# Patient Record
Sex: MALE | Race: WHITE | Employment: OTHER | URBAN - METROPOLITAN AREA
[De-identification: names, ages, dates, MRNs, and addresses within clinical notes are randomized per-mention and may not be internally consistent; named-entity substitution may affect disease eponyms.]

---

## 2018-02-16 ENCOUNTER — OFFICE VISIT (OUTPATIENT)
Dept: HEMATOLOGY | Age: 63
End: 2018-02-16

## 2018-02-16 VITALS
WEIGHT: 232.8 LBS | HEART RATE: 71 BPM | OXYGEN SATURATION: 96 % | TEMPERATURE: 97.7 F | SYSTOLIC BLOOD PRESSURE: 144 MMHG | BODY MASS INDEX: 37.41 KG/M2 | DIASTOLIC BLOOD PRESSURE: 67 MMHG | HEIGHT: 66 IN

## 2018-02-16 DIAGNOSIS — K76.0 FATTY LIVER: Primary | ICD-10-CM

## 2018-02-16 RX ORDER — ASPIRIN 81 MG/1
81 TABLET ORAL DAILY
COMMUNITY
End: 2019-07-24

## 2018-02-16 NOTE — PROGRESS NOTES
70 Daryl Zamora MD, [address], Cite Mongi Slim, 5800 [facility]       April Charanjit Winslow, LIYA Hutchins, NANCY Charles, [facility]   Stef Cordoba, LIYA Sarmiento, NP       Demetria Deputado [facility] Rodriguez 136    at [address], [address] [place] [address], [facility].    [phone]    FAX: [address] [place] [address]    at [address], [address], [address] [place] [address],[address]    1932 [facility] Street: [phone]         Patient Care Team:  Yamileth Mendoza MD as PCP - General (Geriatric Medicine)      Problem List  Date Reviewed: 2/16/2018          Codes Class Noted    Fatty liver ICD-10-CM: K76.0  ICD-9-CM: 571.8  2/16/2018        Chronic blood loss anemia ICD-10-CM: D50.0  ICD-9-CM: 280.0  1/12/2013        Chronic airway obstruction, not elsewhere classified ICD-10-CM: J44.9  ICD-9-CM: 051  1/11/2013        HTN (hypertension), benign ICD-10-CM: I10  ICD-9-CM: 401.1  1/11/2013        Anemia, unspecified ICD-10-CM: D64.9  ICD-9-CM: 285.9  1/11/2013        Obstructive sleep apnea (adult) (pediatric) ICD-10-CM: G47.33  ICD-9-CM: 327.23  6/37/5513        Diastolic CHF, acute on chronic [facility]) ICD-10-CM: I50.33  ICD-9-CM: 428.33, 428.0  8/23/2012        CAD (coronary artery disease) ICD-10-CM: I25.10  ICD-9-CM: 414.00  8/23/2012                The physicians listed above have asked me to see Kami Chucho in consultation regarding elevated liver enzymes and its management. All medical records sent by the referring physicians were reviewed including imaging studies     The patient is a 60 y.o.  male who is suspected to have fatty liver disease. Serologic evaluation was either not perfomred or the results are not available to me. Ultrasound of the liver was performed in 12/2017.   The results of the imaging suggested the liver was normal.      An assessment of liver fibrosis with biopsy or elastography has not been performed. The most recent laboratory studies indicate that the liver transaminases are elevated, ALP is normal, tests of hepatic synthetic and metabolic function are normal, and the platelet count is normal.    The patient has no symptoms which could be attributed to the liver disorder. The patient completes all daily activities without any functional limitations. The patient has not experienced fatigue, pain in the right side over the liver,       ALLERGIES  Allergies   Allergen Reactions    Ciprofloxacin Unknown (comments)    Ezetimibe Unknown (comments)    Penicillins Rash    Pravastatin Unknown (comments)    Simvastatin Unknown (comments)       MEDICATIONS  Current Outpatient Prescriptions   Medication Sig    fluticasone furoate (ARNUITY ELLIPTA) 100 mcg/actuation dsdv inhaler Take 1 Puff by inhalation daily.  exenatide microspheres (BYDUREON BCISE) 2 mg/0.85 mL atIn by SubCUTAneous route.  niroglycerine compounded injection 100-200 mcg once.  insulin lispro protamine/insulin lispro (HUMALOG MIX 50-50 INSULN U-100) 100 unit/mL (50-50) injection by SubCUTAneous route.  UMECLIDINIUM BRM/VILANTEROL TR (ANORO ELLIPTA IN) Take  by inhalation.  INSULIN GLARGINE,HUM. REC. ANLOG (TOUJEO SOLOSTAR U-300 INSULIN SC) by SubCUTAneous route.  aspirin delayed-release 81 mg tablet Take  by mouth daily.  pantoprazole (PROTONIX) 40 mg tablet Take 1 Tab by mouth Before breakfast and dinner.  ferrous sulfate (IRON) 325 mg (65 mg iron) tablet Take 1 Tab by mouth Daily (before breakfast).  tiotropium (SPIRIVA WITH HANDIHALER) 18 mcg inhalation capsule Take 1 Cap by inhalation daily.  omega-3 fatty acids-vitamin e (FISH OIL) 1,000 mg cap Take 1 Cap by mouth daily.  furosemide (LASIX) 40 mg tablet Take 40 mg by mouth daily.       diltiazem (TIAZAC) 180 mg SR capsule Take 180 mg by mouth daily.  guaiFENesin (ORGANIDIN) 400 mg tablet Take  by mouth two (2) times a day.  potassium chloride SR (KLOR-CON 10) 10 mEq tablet Take 2 Tabs by mouth two (2) times a day.  fluticasone-salmeterol (ADVAIR DISKUS) 250-50 mcg/dose diskus inhaler Take 1 Puff by inhalation every twelve (12) hours.  atorvastatin (LIPITOR) 10 mg tablet Take 10 mg by mouth daily.  clonazePAM (KLONOPIN) 0.5 mg tablet Take 0.5 mg by mouth nightly as needed.  losartan (COZAAR) 50 mg tablet Take 50 mg by mouth daily.  metoprolol-XL (TOPROL-XL) 50 mg XL tablet Take 50 mg by mouth daily.  nitroglycerin (NITROSTAT) 0.4 mg SL tablet by SubLINGual route every five (5) minutes as needed.  montelukast (SINGULAIR) 10 mg tablet Take 10 mg by mouth daily.  albuterol (VENTOLIN HFA) 90 mcg/actuation inhaler Take 2 Puffs by inhalation every four (4) hours as needed. No current facility-administered medications for this visit. SYSTEM REVIEW NOT RELATED TO LIVER DISEASE OR REVIEWED ABOVE:  Constitution systems: Negative for fever, chills, weight gain, weight loss. Eyes: Negative for visual changes. ENT: Negative for sore throat, painful swallowing. Respiratory: Negative for cough, hemoptysis, SOB. Cardiology: Negative for chest pain, palpitations. GI:  Negative for constipation or diarrhea. : Negative for urinary frequency, dysuria, hematuria, nocturia. Skin: Negative for rash. Hematology: Negative for easy bruising, blood clots. Musculo-skelatal: Negative for back pain, muscle pain, weakness. Neurologic: Negative for headaches, dizziness, vertigo, memory problems not related to HE. Psychology: Negative for anxiety, depression. FAMILY HISTORY:  The father  of dementia. The mother has the following chronic diseases: dementia. There is no family history of liver disease. SOCIAL HISTORY:  The patient is .     The patient has 2 children, and 3 grandchildren. The patient stopped using tobacco products in 10/1998. The patient has previously consumed alcohol socially never in excess. The patient has been abstinent from alcohol since 6/2014. The patient used to work as a muscician. Burt Mims PHYSICAL EXAMINATION:  Visit Vitals    /67    Pulse 71    Temp 97.7 °F (36.5 °C) (Tympanic)    Ht 5' 6\" (1.676 m)    Wt 232 lb 12.8 oz (105.6 kg)    SpO2 96%    BMI 37.57 kg/m2     General: No acute distress. Eyes: Sclera anicteric. ENT: No oral lesions. Thyroid normal.  Nodes: No adenopathy. Skin: No spider angiomata. No jaundice. No palmar erythema. Respiratory: Lungs clear to auscultation. Cardiovascular: Regular heart rate. No murmurs. No JVD. Abdomen: Soft non-tender. Liver size normal to percussion/palpation. Spleen not palpable. No obvious ascites. Extremities: No edema. No muscle wasting. No gross arthritic changes. Neurologic: Alert and oriented. Cranial nerves grossly intact. No asterixis. LABORATORY STUDIES:  Recent liver function panel, CBC with platelet count and BMP are not available. These studies will be performed. SEROLOGIES:  Not available or performed. Testing was performed today. LIVER HISTOLOGY:  Not available or performed    ENDOSCOPIC PROCEDURES:  Not available or performed    RADIOLOGY:  12/2017. Ultrasound of liver. Normal appearing liver. No liver mass lesions. OTHER TESTING:  Not available or performed    ASSESSMENT AND PLAN:  Elevation in liver enzymes of unclear etiology at this time. Serologic testing to help define the cause of the laboratory abnormality were ordered. Will perform laboratory testing to monitor liver function and degree of liver injury. This will include BMP, hepatic panel, CBC with platelet count, INR. The need to perform an assessment of liver fibrosis was discussed with the patient.   The Fibroscan can assess liver fibrosis and determine if a patient has advanced fibrosis or cirrhosis without the need for liver biopsy. The Fibroscan is currently available at [facility]. This will be performed at the next office visit. If the Fibroscan suggests advanced fibrosis then a liver biopsy should be considered. The patient was directed to continue all current medications at the current dosages. There are no contraindications for the patient to take any medications that are necessary for treatment of other medical issues. The patient was counseled to limit alcohol consumption. All of the above issues were discussed with the patient. All questions were answered. The patient expressed a clear understanding of the above. [address] [place] [address] in 4 weeks for Fibroscan, to review all data and determine the treatment plan.     Kirill Estevez MD  [facility] [address] 2718 [facility] [address] [facility] 22.  [phone]

## 2018-02-16 NOTE — MR AVS SNAPSHOT
1111 [facility] [id number] [facility] [employer] 13 
[phone] Patient: Glody Lopez MRN: [id number] LNR:9/42/1970 Visit Information Date & Time Provider Department Dept. Phone Encounter #  
 2/16/2018  3:00 PM Nancy Rodriguez. Breanna [address] [place] [address] [id number] Follow-up Instructions Return in about 4 weeks (around 3/16/2018) for MLS with FS. Upcoming Health Maintenance Date Due Hepatitis C Screening 1955 DTaP/Tdap/Td series (1 - Tdap) 5/20/1975 FOBT Q 1 YEAR AGE 50-75 5/20/2004 ZOSTER VACCINE AGE 60> 3/20/2014 Influenza Age 5 to Adult 8/1/2017 Allergies as of 2/16/2018  Review Complete On: 2/16/2018 By: Neto Valverde LPN Severity Noted Reaction Type Reactions Ciprofloxacin  08/22/2012    Unknown (comments) Ezetimibe  08/22/2012    Unknown (comments) Penicillins  08/22/2012    Rash Pravastatin  08/22/2012    Unknown (comments) Simvastatin  08/22/2012    Unknown (comments) Current Immunizations  Never Reviewed Name Date ZZZ-RETIRED (DO NOT USE) Pneumococcal Vaccine (Unspecified Type) 1/1/2001 Not reviewed this visit You Were Diagnosed With   
  
 Codes Comments Fatty liver    -  Primary ICD-10-CM: K76.0 ICD-9-CM: 571.8 Vitals BP Pulse Temp Height(growth percentile) Weight(growth percentile) SpO2  
 144/67 71 97.7 °F (36.5 °C) (Tympanic) 5' 6\" (1.676 m) 232 lb 12.8 oz (105.6 kg) 96% BMI Smoking Status 37.57 kg/m2 Former Smoker BMI and BSA Data Body Mass Index Body Surface Area  
 37.57 kg/m 2 2.22 m 2 Your Updated Medication List  
  
   
This list is accurate as of: 2/16/18  4:06 PM.  Always use your most recent med list.  
  
  
  
  
 [facility] 250-50 mcg/dose diskus inhaler Generic drug:  fluticasone-salmeterol Take 1 Puff by inhalation every twelve (12) hours.   
  
 ANORO ELLIPTA IN  
 Take  by inhalation. ARNUITY ELLIPTA 100 mcg/actuation Dsdv inhaler Generic drug:  fluticasone furoate Take 1 Puff by inhalation daily. aspirin delayed-release 81 mg tablet Take  by mouth daily. atorvastatin 10 mg tablet Commonly known as:  LIPITOR Take 10 mg by mouth daily. BYDUREON BCISE 2 mg/0.85 mL Atin Generic drug:  exenatide microspheres  
by SubCUTAneous route. clonazePAM 0.5 mg tablet Commonly known as:  [employer] Take 0.5 mg by mouth nightly as needed. COZAAR 50 mg tablet Generic drug:  losartan Take 50 mg by mouth daily. dilTIAZem 180 mg SR capsule Commonly known as:  [facility] Take 180 mg by mouth daily. ferrous sulfate 325 mg (65 mg iron) tablet Commonly known as:  Iron Take 1 Tab by mouth Daily (before breakfast). FISH OIL 1,000 mg Cap Generic drug:  omega-3 fatty acids-vitamin e Take 1 Cap by mouth daily. guaiFENesin 400 mg tablet Commonly known as:  [employer] [place] Take  by mouth two (2) times a day. HumaLOG Mix 50-50 Insuln U-100 100 unit/mL (50-50) injection Generic drug:  insulin lispro protamine/insulin lispro  
by SubCUTAneous route. LASIX 40 mg tablet Generic drug:  furosemide Take 40 mg by mouth daily. metoprolol succinate 50 mg XL tablet Commonly known as:  TOPROL-XL Take 50 mg by mouth daily. niroglycerine compounded injection 100-200 mcg once. NITROSTAT 0.4 mg SL tablet Generic drug:  nitroglycerin  
by SubLINGual route every five (5) minutes as needed. pantoprazole 40 mg tablet Commonly known as:  PROTONIX Take 1 Tab by mouth Before breakfast and dinner. potassium chloride SR 10 mEq tablet Commonly known as:  KLOR-CON 10 Take 2 Tabs by mouth two (2) times a day. SINGULAIR 10 mg tablet Generic drug:  montelukast  
Take 10 mg by mouth daily. tiotropium 18 mcg inhalation capsule Commonly known as:  [employer] [facility] Weiner Choi Kaggle  
 Take 1 Cap by inhalation daily. TOUJEO SOLOSTAR U-300 INSULIN SC  
by SubCUTAneous route. VENTOLIN HFA 90 mcg/actuation inhaler Generic drug:  albuterol Take 2 Puffs by inhalation every four (4) hours as needed. We Performed the Following ACTIN (SMOOTH MUSCLE) ANTIBODY C6325444 CPT(R)] ALPHA-1-ANTITRYPSIN, TOTAL [72705 CPT(R)] ANTINUCLEAR ANTIBODIES, IFA [id number] CPT(R)] CBC WITH AUTOMATED DIFF [96914 CPT(R)] FERRITIN [76259 CPT(R)] HCV AB W/REFLEX VERIFICATION [[id number] Custom] HEP A AB, TOTAL [id number] CPT(R)] HEP B SURFACE AB [id number] CPT(R)] HEP B SURFACE AG [id number] CPT(R)] HEPATIC FUNCTION PANEL [19616 CPT(R)] HEPATITIS B CORE AB, TOTAL [id number] CPT(R)] IRON PROFILE [id number] CPT(R)] METABOLIC PANEL, BASIC [81313 CPT(R)] PROTHROMBIN TIME + INR [91160 CPT(R)] Follow-up Instructions Return in about 4 weeks (around 3/16/2018) for MLS with FS. To-Do List   
 03/29/2018 11:00 AM  
  Appointment with Devin Duke MD at [address] ([phone]) Introducing [facility] SERVICES! [employer] introduces [employer] patient portal. Now you can access parts of your medical record, email your doctor's office, and request medication refills online. 1. In your internet browser, go to [email or web address] 2. Click on the First Time User? Click Here link in the Sign In box. You will see the New Member Sign Up page. 3. Enter your [employer] Access Code exactly as it appears below. You will not need to use this code after youve completed the sign-up process. If you do not sign up before the expiration date, you must request a new code. · [employer] Access Code: [email or web address] Expires: 5/17/2018  4:06 PM 
 
4. Enter the last four digits of your Social Security Number (xxxx) and Date of Birth (mm/dd/yyyy) as indicated and click Submit. You will be taken to the next sign-up page. 5. Create a [employer] ID. This will be your [employer] login ID and cannot be changed, so think of one that is secure and easy to remember. 6. Create a [employer] password. You can change your password at any time. 7. Enter your Password Reset Question and Answer. This can be used at a later time if you forget your password. 8. Enter your e-mail address. You will receive e-mail notification when new information is available in [address] 9. Click Sign Up. You can now view and download portions of your medical record. 10. Click the Download Summary menu link to download a portable copy of your medical information. If you have questions, please visit the Frequently Asked Questions section of the [employer] website. Remember, [employer] is NOT to be used for urgent needs. For medical emergencies, dial 911. Now available from your iPhone and Android! Please provide this summary of care documentation to your next provider. Your primary care clinician is listed as Teresa Trujillo. If you have any questions after today's visit, please call [phone].

## 2018-02-17 LAB
ALBUMIN SERPL-MCNC: 4 G/DL (ref 3.6–4.8)
ALP SERPL-CCNC: 106 IU/L (ref 39–117)
ALT SERPL-CCNC: 53 IU/L (ref 0–44)
AST SERPL-CCNC: 49 IU/L (ref 0–40)
BASOPHILS # BLD AUTO: 0.1 X10E3/UL (ref 0–0.2)
BASOPHILS NFR BLD AUTO: 0 %
BILIRUB DIRECT SERPL-MCNC: 0.11 MG/DL (ref 0–0.4)
BILIRUB SERPL-MCNC: 0.2 MG/DL (ref 0–1.2)
BUN SERPL-MCNC: 18 MG/DL (ref 8–27)
BUN/CREAT SERPL: 23 (ref 10–24)
CALCIUM SERPL-MCNC: 8.5 MG/DL (ref 8.6–10.2)
CHLORIDE SERPL-SCNC: 100 MMOL/L (ref 96–106)
CO2 SERPL-SCNC: 22 MMOL/L (ref 18–29)
CREAT SERPL-MCNC: 0.78 MG/DL (ref 0.76–1.27)
EOSINOPHIL # BLD AUTO: 0.6 X10E3/UL (ref 0–0.4)
EOSINOPHIL NFR BLD AUTO: 5 %
ERYTHROCYTE [DISTWIDTH] IN BLOOD BY AUTOMATED COUNT: 14.1 % (ref 12.3–15.4)
FERRITIN SERPL-MCNC: 31 NG/ML (ref 30–400)
GFR SERPLBLD CREATININE-BSD FMLA CKD-EPI: 111 ML/MIN/1.73
GFR SERPLBLD CREATININE-BSD FMLA CKD-EPI: 96 ML/MIN/1.73
GLUCOSE SERPL-MCNC: 144 MG/DL (ref 65–99)
HCT VFR BLD AUTO: 45.5 % (ref 37.5–51)
HGB BLD-MCNC: 15.3 G/DL (ref 13–17.7)
IMM GRANULOCYTES # BLD: 0 X10E3/UL (ref 0–0.1)
IMM GRANULOCYTES NFR BLD: 0 %
INR PPP: 1 (ref 0.8–1.2)
IRON SATN MFR SERPL: 22 % (ref 15–55)
IRON SERPL-MCNC: 99 UG/DL (ref 38–169)
LYMPHOCYTES # BLD AUTO: 3.7 X10E3/UL (ref 0.7–3.1)
LYMPHOCYTES NFR BLD AUTO: 28 %
MCH RBC QN AUTO: 31.7 PG (ref 26.6–33)
MCHC RBC AUTO-ENTMCNC: 33.6 G/DL (ref 31.5–35.7)
MCV RBC AUTO: 94 FL (ref 79–97)
MONOCYTES # BLD AUTO: 1.2 X10E3/UL (ref 0.1–0.9)
MONOCYTES NFR BLD AUTO: 9 %
NEUTROPHILS # BLD AUTO: 7.5 X10E3/UL (ref 1.4–7)
NEUTROPHILS NFR BLD AUTO: 58 %
PLATELET # BLD AUTO: 166 X10E3/UL (ref 150–379)
POTASSIUM SERPL-SCNC: 4.1 MMOL/L (ref 3.5–5.2)
PROT SERPL-MCNC: 7.5 G/DL (ref 6–8.5)
PROTHROMBIN TIME: 10.8 SEC (ref 9.1–12)
RBC # BLD AUTO: 4.82 X10E6/UL (ref 4.14–5.8)
SODIUM SERPL-SCNC: 139 MMOL/L (ref 134–144)
TIBC SERPL-MCNC: 442 UG/DL (ref 250–450)
UIBC SERPL-MCNC: 343 UG/DL (ref 111–343)
WBC # BLD AUTO: 13 X10E3/UL (ref 3.4–10.8)

## 2018-02-18 LAB
A1AT SERPL-MCNC: 170 MG/DL (ref 90–200)
ACTIN IGG SERPL-ACNC: 11 UNITS (ref 0–19)

## 2018-02-19 LAB
ANA TITR SER IF: NEGATIVE {TITER}
COMMENT, 144067: NORMAL
HAV AB SER QL IA: NEGATIVE
HBV CORE AB SERPL QL IA: NEGATIVE
HBV SURFACE AB SER QL: NON REACTIVE
HBV SURFACE AG SERPL QL IA: NEGATIVE
HCV AB S/CO SERPL IA: <0.1 S/CO RATIO (ref 0–0.9)

## 2018-03-29 ENCOUNTER — OFFICE VISIT (OUTPATIENT)
Dept: HEMATOLOGY | Age: 63
End: 2018-03-29

## 2018-03-29 VITALS
HEART RATE: 69 BPM | TEMPERATURE: 97.5 F | WEIGHT: 229.6 LBS | BODY MASS INDEX: 37.06 KG/M2 | SYSTOLIC BLOOD PRESSURE: 109 MMHG | OXYGEN SATURATION: 94 % | DIASTOLIC BLOOD PRESSURE: 57 MMHG

## 2018-03-29 DIAGNOSIS — K75.81 NASH (NONALCOHOLIC STEATOHEPATITIS): Primary | ICD-10-CM

## 2018-03-29 NOTE — MR AVS SNAPSHOT
1111 [facility] [id number] [address] [place] [address] 
[phone] Patient: Osmel Rodriguez MRN: [id number] YCL:[birth year] Visit Information Date & Time Provider Department Dept. Phone Encounter #  
 3/29/2018 11:00 AM Delmar Franklin Breanna [place] [address] [id number] Your Appointments 5/3/2018 11:30 AM  
PROCEDURE with MD Rajat Franklin [address] [place] [address]) Appt Note: [address] [id number] [facility] [address]  
[address] [place] [address] 5/17/2018 10:15 AM  
Follow Up with MD Rajat Franklin  [address] [place] [address]) Appt Note: LBX follow up [address] [place] [address] At [facility] [id number] [facility] [address]  
[address] [place] [address] Upcoming Health Maintenance Date Due DTaP/Tdap/Td series (1 - Tdap) 5/20/1975 FOBT Q 1 YEAR AGE 50-75 5/20/2004 ZOSTER VACCINE AGE 60> 3/20/2014 Influenza Age 5 to Adult 8/1/2017 Allergies as of 3/29/2018  Review Complete On: 3/29/2018 By: Russell Kulkarni LPN Severity Noted Reaction Type Reactions Ciprofloxacin  08/22/2012    Unknown (comments) Ezetimibe  08/22/2012    Unknown (comments) Penicillins  08/22/2012    Rash Pravastatin  08/22/2012    Unknown (comments) Simvastatin  08/22/2012    Unknown (comments) Current Immunizations  Never Reviewed Name Date ZZZ-RETIRED (DO NOT USE) Pneumococcal Vaccine (Unspecified Type) 1/1/2001 Not reviewed this visit You Were Diagnosed With   
  
 Codes Comments ALFONSO (nonalcoholic steatohepatitis)    -  Primary ICD-10-CM: Q52.50 ICD-9-CM: 571.8 Vitals BP Pulse Temp Weight(growth percentile) SpO2 BMI  
 109/57 (BP 1 Location: Left arm, BP Patient Position: Sitting) 69 97.5 °F (36.4 °C) (Tympanic) 229 lb 9.6 oz (104.1 kg) 94% 37.06 kg/m2 Smoking Status Former Smoker Vitals History BMI and BSA Data Body Mass Index Body Surface Area 37.06 kg/m 2 2.2 m 2 Your Updated Medication List  
  
   
This list is accurate as of 3/29/18 11:55 AM.  Always use your most recent med list.  
  
  
  
  
 Jenniffer Lawson 250-50 mcg/dose diskus inhaler Generic drug:  fluticasone-salmeterol Take 1 Puff by inhalation every twelve (12) hours. ANORO ELLIPTA IN Take  by inhalation. ARNUITY ELLIPTA 100 mcg/actuation Dsdv inhaler Generic drug:  fluticasone furoate Take 1 Puff by inhalation daily. aspirin delayed-release 81 mg tablet Take  by mouth daily. atorvastatin 10 mg tablet Commonly known as:  LIPITOR Take 10 mg by mouth daily. BYDUREON BCISE 2 mg/0.85 mL Atin Generic drug:  exenatide microspheres  
by SubCUTAneous route. clonazePAM 0.5 mg tablet Commonly known as:  Adaline Apley Take 0.5 mg by mouth nightly as needed. COZAAR 50 mg tablet Generic drug:  losartan Take 50 mg by mouth daily. dilTIAZem 180 mg SR capsule Commonly known as:  [facility] Take 180 mg by mouth daily. ferrous sulfate 325 mg (65 mg iron) tablet Commonly known as:  Iron Take 1 Tab by mouth Daily (before breakfast). FISH OIL 1,000 mg Cap Generic drug:  omega-3 fatty acids-vitamin e Take 1 Cap by mouth daily. guaiFENesin 400 mg tablet Commonly known as:  Stephania Orts Take  by mouth two (2) times a day. HumaLOG Mix 50-50 Insuln U-100 100 unit/mL (50-50) injection Generic drug:  insulin lispro protamine/insulin lispro  
by SubCUTAneous route. LASIX 40 mg tablet Generic drug:  furosemide Take 40 mg by mouth daily. metoprolol succinate 50 mg XL tablet Commonly known as:  TOPROL-XL Take 50 mg by mouth daily. niroglycerine compounded injection 100-200 mcg once. NITROSTAT 0.4 mg SL tablet Generic drug:  nitroglycerin by SubLINGual route every five (5) minutes as needed. pantoprazole 40 mg tablet Commonly known as:  PROTONIX Take 1 Tab by mouth Before breakfast and dinner. potassium chloride SR 10 mEq tablet Commonly known as:  KLOR-CON 10 Take 2 Tabs by mouth two (2) times a day. SINGULAIR 10 mg tablet Generic drug:  montelukast  
Take 10 mg by mouth daily. tiotropium 18 mcg inhalation capsule Commonly known as:  [address] Take 1 Cap by inhalation daily. TOUJEO SOLOSTAR U-300 INSULIN SC  
by SubCUTAneous route. VENTOLIN HFA 90 mcg/actuation inhaler Generic drug:  albuterol Take 2 Puffs by inhalation every four (4) hours as needed. To-Do List   
 04/28/2018 Procedures:  BIOPSY LIVER Introducing [facility] & HEALTH SERVICES! Carmelita Fothergill introduces [employer] patient portal. Now you can access parts of your medical record, email your doctor's office, and request medication refills online. 1. In your internet browser, go to [email or web address] 2. Click on the First Time User? Click Here link in the Sign In box. You will see the New Member Sign Up page. 3. Enter your [employer] Access Code exactly as it appears below. You will not need to use this code after youve completed the sign-up process. If you do not sign up before the expiration date, you must request a new code. · [employer] Access Code: [email or web address] Expires: 5/17/2018  5:06 PM 
 
4. Enter the last four digits of your Social Security Number (xxxx) and Date of Birth (mm/dd/yyyy) as indicated and click Submit. You will be taken to the next sign-up page. 5. Create a [employer] ID. This will be your [employer] login ID and cannot be changed, so think of one that is secure and easy to remember. 6. Create a [employer] password. You can change your password at any time. 7. Enter your Password Reset Question and Answer.  This can be used at a later time if you forget your password. 8. Enter your e-mail address. You will receive e-mail notification when new information is available in [address] 9. Click Sign Up. You can now view and download portions of your medical record. 10. Click the Download Summary menu link to download a portable copy of your medical information. If you have questions, please visit the Frequently Asked Questions section of the [employer] website. Remember, [employer] is NOT to be used for urgent needs. For medical emergencies, dial 911. Now available from your iPhone and Android! Please provide this summary of care documentation to your next provider. Your primary care clinician is listed as Vanita Edmondson. If you have any questions after today's visit, please call [phone].

## 2018-03-29 NOTE — PROGRESS NOTES
70 Daryl Zamora MD, [address], [facility], [place]       April Jannette Malloy, LIYA Cassidy, NANCY Cedeno, ISIDROP-BC   Rossy Dunn, LIYA Guillen [facility] 136    at [address], [address] [facility] [place]  22.    [phone]    FAX: [address] [place] [address] Avenue    at [facility], [address] [place] [address], [address] [facility],[address]    [address] [facility] [address]: [phone]         Patient Care Team:  Jaqueline Kim MD as PCP - General (Geriatric Medicine)      Problem List  Date Reviewed: 2/16/2018          Codes Class Noted    NAFLD (nonalcoholic fatty liver disease) ICD-10-CM: K76.0  ICD-9-CM: 571.8  2/16/2018        Chronic blood loss anemia ICD-10-CM: D50.0  ICD-9-CM: 280.0  1/12/2013        Chronic airway obstruction, not elsewhere classified [facility]) ICD-10-CM: J44.9  ICD-9-CM: 619  1/11/2013        HTN (hypertension), benign ICD-10-CM: I10  ICD-9-CM: 401.1  1/11/2013        Anemia, unspecified ICD-10-CM: D64.9  ICD-9-CM: 285.9  1/11/2013        Obstructive sleep apnea (adult) (pediatric) ICD-10-CM: G47.33  ICD-9-CM: 327.23  0/46/1342        Diastolic CHF, acute on chronic [facility]) ICD-10-CM: I50.33  ICD-9-CM: 428.33, 428.0  8/23/2012        CAD (coronary artery disease) ICD-10-CM: I25.10  ICD-9-CM: 414.00  8/23/2012              Gretta Araya returns to the [address] for management of non-alcoholic fatty liver disease (NAFLD). The active problem list, all pertinent past medical history, medications, radiologic findings and laboratory findings related to the liver disorder were reviewed with the patient. The patient is a 60 y.o.   male who was found to have elevated liver enzymes in 2017 and is suspected to have fatty liver disease. Serologic evaluation for causes of chronic liver disease were negative. Ultrasound of the liver was performed in 12/2017. The results of the imaging suggested the liver was normal.      Assessment of liver fibrosis was performed in the office today with Fibroscan. The result was 31.2 kPa which correlates with cirrhosis. The most recent laboratory studies indicate that the liver transaminases are elevated, ALP is normal, tests of hepatic synthetic and metabolic function are normal, and the platelet count is normal.    The patient has no symptoms which could be attributed to the liver disorder. The patient completes all daily activities without any functional limitations. The patient has not experienced fatigue, pain in the right side over the liver,       ALLERGIES  Allergies   Allergen Reactions    Ciprofloxacin Unknown (comments)    Ezetimibe Unknown (comments)    Penicillins Rash    Pravastatin Unknown (comments)    Simvastatin Unknown (comments)       MEDICATIONS  Current Outpatient Prescriptions   Medication Sig    fluticasone furoate (ARNUITY ELLIPTA) 100 mcg/actuation dsdv inhaler Take 1 Puff by inhalation daily.  exenatide microspheres (BYDUREON BCISE) 2 mg/0.85 mL atIn by SubCUTAneous route.  insulin lispro protamine/insulin lispro (HUMALOG MIX 50-50 INSULN U-100) 100 unit/mL (50-50) injection by SubCUTAneous route.  UMECLIDINIUM BRM/VILANTEROL TR (ANORO ELLIPTA IN) Take  by inhalation.  INSULIN GLARGINE,HUM. REC. ANLOG (TOUJEO SOLOSTAR U-300 INSULIN SC) by SubCUTAneous route.  aspirin delayed-release 81 mg tablet Take  by mouth daily.  pantoprazole (PROTONIX) 40 mg tablet Take 1 Tab by mouth Before breakfast and dinner.  diltiazem (TIAZAC) 180 mg SR capsule Take 180 mg by mouth daily.  atorvastatin (LIPITOR) 10 mg tablet Take 10 mg by mouth daily.  losartan (COZAAR) 50 mg tablet Take 50 mg by mouth daily.       metoprolol-XL (TOPROL-XL) 50 mg XL tablet Take 50 mg by mouth daily.  nitroglycerin (NITROSTAT) 0.4 mg SL tablet by SubLINGual route every five (5) minutes as needed.  montelukast (SINGULAIR) 10 mg tablet Take 10 mg by mouth daily.  albuterol (VENTOLIN HFA) 90 mcg/actuation inhaler Take 2 Puffs by inhalation every four (4) hours as needed.  niroglycerine compounded injection 100-200 mcg once.  ferrous sulfate (IRON) 325 mg (65 mg iron) tablet Take 1 Tab by mouth Daily (before breakfast).  tiotropium (SPIRIVA WITH HANDIHALER) 18 mcg inhalation capsule Take 1 Cap by inhalation daily.  omega-3 fatty acids-vitamin e (FISH OIL) 1,000 mg cap Take 1 Cap by mouth daily.  furosemide (LASIX) 40 mg tablet Take 40 mg by mouth daily.  guaiFENesin (ORGANIDIN) 400 mg tablet Take  by mouth two (2) times a day.  potassium chloride SR (KLOR-CON 10) 10 mEq tablet Take 2 Tabs by mouth two (2) times a day.  fluticasone-salmeterol (ADVAIR DISKUS) 250-50 mcg/dose diskus inhaler Take 1 Puff by inhalation every twelve (12) hours.  clonazePAM (KLONOPIN) 0.5 mg tablet Take 0.5 mg by mouth nightly as needed. No current facility-administered medications for this visit. SYSTEM REVIEW NOT RELATED TO LIVER DISEASE OR REVIEWED ABOVE:  Constitution systems: Negative for fever, chills, weight gain, weight loss. Eyes: Negative for visual changes. ENT: Negative for sore throat, painful swallowing. Respiratory: Negative for cough, hemoptysis, SOB. Cardiology: Negative for chest pain, palpitations. GI:  Negative for constipation or diarrhea. : Negative for urinary frequency, dysuria, hematuria, nocturia. Skin: Negative for rash. Hematology: Negative for easy bruising, blood clots. Musculo-skelatal: Negative for back pain, muscle pain, weakness. Neurologic: Negative for headaches, dizziness, vertigo, memory problems not related to HE. Psychology: Negative for anxiety, depression.        FAMILY HISTORY:  The father  of dementia. The mother has the following chronic diseases: dementia. There is no family history of liver disease. SOCIAL HISTORY:  The patient is . The patient has 2 children, and 3 grandchildren. The patient stopped using tobacco products in 10/1998. The patient has previously consumed alcohol socially never in excess. The patient has been abstinent from alcohol since 2014. The patient used to work as a muscician. Alexa Eckert PHYSICAL EXAMINATION:  Visit Vitals    /57 (BP 1 Location: Left arm, BP Patient Position: Sitting)    Pulse 69    Temp 97.5 °F (36.4 °C) (Tympanic)    Wt 229 lb 9.6 oz (104.1 kg)    SpO2 94%    BMI 37.06 kg/m2     General: No acute distress. Eyes: Sclera anicteric. ENT: No oral lesions. Thyroid normal.  Nodes: No adenopathy. Skin: No spider angiomata. No jaundice. No palmar erythema. Respiratory: Lungs clear to auscultation. Cardiovascular: Regular heart rate. No murmurs. No JVD. Abdomen: Soft non-tender. Liver size normal to percussion/palpation. Spleen not palpable. No obvious ascites. Extremities: No edema. No muscle wasting. No gross arthritic changes. Neurologic: Alert and oriented. Cranial nerves grossly intact. No asterixis.     LABORATORY STUDIES:  Liver [place] of [address] [place] [address] 2018   WBC 3.4 - 10.8 x10E3/uL 13.0 (H)   ANC 1.4 - 7.0 x10E3/uL 7.5 (H)   HGB 13.0 - 17.7 g/dL 15.3    - 379 x10E3/uL 166   INR 0.8 - 1.2 1.0   AST 0 - 40 IU/L 49 (H)   ALT 0 - 44 IU/L 53 (H)   Alk Phos 39 - 117 IU/L 106   Bili, Total 0.0 - 1.2 mg/dL 0.2   Bili, Direct 0.00 - 0.40 mg/dL 0.11   Albumin 3.6 - 4.8 g/dL 4.0   BUN 8 - 27 mg/dL 18   Creat 0.76 - 1.27 mg/dL 0.78   Na 134 - 144 mmol/L 139   K 3.5 - 5.2 mmol/L 4.1   Cl 96 - 106 mmol/L 100   CO2 18 - 29 mmol/L 22   Glucose 65 - 99 mg/dL 144 (H)     SEROLOGIES:  Serologies Latest Ref Rng & Units 2018   Hep A Ab, Total Negative Negative   Hep B Surface Ag Negative Negative   Hep B Core Ab, Total Negative Negative   Hep B Surface AB QL  Non Reactive   Hep C Ab 0.0 - 0.9 s/co ratio <0.1   Ferritin 30 - 400 ng/mL 31   Iron % Saturation 15 - 55 % 22   LAUREN, IFA  Negative   ASMCA 0 - 19 Units 11   Alpha-1 antitrypsin level 90 - 200 mg/dL 170     LIVER HISTOLOGY:  3/2018. FibroScan performed at [facility]. EkPa was 31.2. IQR/med 56%. . Consistent with ALFONSO and cirrhosis. ENDOSCOPIC PROCEDURES:  Not available or performed    RADIOLOGY:  12/2017. Ultrasound of liver. Normal appearing liver. No liver mass lesions. OTHER TESTING:  Not available or performed    ASSESSMENT AND PLAN:  Suspected ALFONSO with cirrhosis. This is based upon Fiboscan with a high CAP and liver stiffness values. Liver transaminases are elevated. Liver function is normal.  The platelet count is normal.      The need to perform liver biopsy to assess disease severity was discussed with the patient. The risks of performing the liver biopsy including pain, puncture of the lung, gallbladder, intestine or kidney and bleeding were discussed. The patient has decided to have a liver biopsy. This will be scheduled. The patient was counseled regarding diet and exercise to achieve weight loss. The best diet for patients with fatty liver is one very low in carbohydrates and enriched with protein such as an Cheyanne's program.      The patient was told to to consume any food products and drinks containing fructose as this enhances hepatic fat synthesis. There is no medication of vitamin supplements that we advocate for ALFONSO. Using glitazones in patients without diabetes mellitus has been shown to reduce fat content in the liver but has no effect on fibrosis and is associated with weight gain. Vitamin E has also been used but the data is not very good and most experts no longer advocate this.       The only medical treatments for ALFONSO are though clinical trials. The patient was offered participation in a clinical trial for treatment of AFLONSO. The patient would like to particiapte in a clinical trial.    The patient was directed to continue all current medications at the current dosages. There are no contraindications for the patient to take any medications that are necessary for treatment of other medical issues including medications for diabetes mellitus and hypercholesterolemia. The patient was counseled regarding alcohol consumption and that this could contribute to fatty liver disease. Vaccination for viral hepatitis A and B is recommended since the patient has no serologic evidence of previous exposure or vaccination with immunity. [facility] [address] screening has recently been performed and does not suggest [facility] [address] The next liver imaging study will be performed in 6/2018. All of the above issues were discussed with the patient. All questions were answered. The patient expressed a clear understanding of the above. [address] [address] in 2 weeks after liver biopsy.     Analy Jauregui MD  Liver [place] of [address] [place] [address] [address]  [address].  [phone]

## 2018-05-03 ENCOUNTER — APPOINTMENT (OUTPATIENT)
Dept: ULTRASOUND IMAGING | Age: 63
End: 2018-05-03
Attending: INTERNAL MEDICINE
Payer: COMMERCIAL

## 2018-05-03 ENCOUNTER — HOSPITAL ENCOUNTER (OUTPATIENT)
Age: 63
Setting detail: OUTPATIENT SURGERY
Discharge: HOME | End: 2018-05-03
Attending: INTERNAL MEDICINE | Admitting: INTERNAL MEDICINE
Payer: COMMERCIAL

## 2018-05-03 VITALS
BODY MASS INDEX: 37.12 KG/M2 | TEMPERATURE: 98.1 F | RESPIRATION RATE: 9 BRPM | WEIGHT: 230 LBS | OXYGEN SATURATION: 94 % | SYSTOLIC BLOOD PRESSURE: 200 MMHG | HEART RATE: 65 BPM | DIASTOLIC BLOOD PRESSURE: 81 MMHG

## 2018-05-03 DIAGNOSIS — K76.0 FATTY (CHANGE OF) LIVER, NOT ELSEWHERE CLASSIFIED: ICD-10-CM

## 2018-05-03 LAB
GLUCOSE BLD STRIP.AUTO-MCNC: 118 MG/DL (ref 65–100)
SERVICE CMNT-IMP: ABNORMAL

## 2018-05-03 PROCEDURE — 74011250636 HC RX REV CODE- 250/636: Performed by: INTERNAL MEDICINE

## 2018-05-03 PROCEDURE — 88313 SPECIAL STAINS GROUP 2: CPT | Performed by: INTERNAL MEDICINE

## 2018-05-03 PROCEDURE — 76942 ECHO GUIDE FOR BIOPSY: CPT

## 2018-05-03 PROCEDURE — 88307 TISSUE EXAM BY PATHOLOGIST: CPT | Performed by: INTERNAL MEDICINE

## 2018-05-03 PROCEDURE — 76040000007: Performed by: INTERNAL MEDICINE

## 2018-05-03 PROCEDURE — 82962 GLUCOSE BLOOD TEST: CPT

## 2018-05-03 PROCEDURE — 77030013826 HC NDL BIOP MAXCOR BARD -B: Performed by: INTERNAL MEDICINE

## 2018-05-03 PROCEDURE — 74011000250 HC RX REV CODE- 250: Performed by: INTERNAL MEDICINE

## 2018-05-03 RX ORDER — LIDOCAINE HYDROCHLORIDE 10 MG/ML
10 INJECTION INFILTRATION; PERINEURAL ONCE
Status: COMPLETED | OUTPATIENT
Start: 2018-05-03 | End: 2018-05-03

## 2018-05-03 RX ORDER — SODIUM CHLORIDE 0.9 % (FLUSH) 0.9 %
5-10 SYRINGE (ML) INJECTION AS NEEDED
Status: DISCONTINUED | OUTPATIENT
Start: 2018-05-03 | End: 2018-05-03 | Stop reason: HOSPADM

## 2018-05-03 RX ORDER — ONDANSETRON 2 MG/ML
4 INJECTION INTRAMUSCULAR; INTRAVENOUS
Status: DISCONTINUED | OUTPATIENT
Start: 2018-05-03 | End: 2018-05-03 | Stop reason: HOSPADM

## 2018-05-03 RX ORDER — HYDROMORPHONE HYDROCHLORIDE 2 MG/ML
1 INJECTION, SOLUTION INTRAMUSCULAR; INTRAVENOUS; SUBCUTANEOUS ONCE
Status: COMPLETED | OUTPATIENT
Start: 2018-05-03 | End: 2018-05-03

## 2018-05-03 RX ORDER — HYDROMORPHONE HYDROCHLORIDE 2 MG/ML
1 INJECTION, SOLUTION INTRAMUSCULAR; INTRAVENOUS; SUBCUTANEOUS
Status: DISCONTINUED | OUTPATIENT
Start: 2018-05-03 | End: 2018-05-03 | Stop reason: HOSPADM

## 2018-05-03 RX ORDER — SODIUM CHLORIDE 0.9 % (FLUSH) 0.9 %
5-10 SYRINGE (ML) INJECTION EVERY 8 HOURS
Status: DISCONTINUED | OUTPATIENT
Start: 2018-05-03 | End: 2018-05-03 | Stop reason: HOSPADM

## 2018-05-03 RX ORDER — ONDANSETRON 2 MG/ML
4-8 INJECTION INTRAMUSCULAR; INTRAVENOUS ONCE
Status: DISCONTINUED | OUTPATIENT
Start: 2018-05-03 | End: 2018-05-03

## 2018-05-03 RX ADMIN — HYDROMORPHONE HYDROCHLORIDE 1 MG: 2 INJECTION INTRAMUSCULAR; INTRAVENOUS; SUBCUTANEOUS at 13:00

## 2018-05-03 RX ADMIN — LIDOCAINE HYDROCHLORIDE 20 ML: 10 INJECTION, SOLUTION INFILTRATION; PERINEURAL at 11:58

## 2018-05-03 RX ADMIN — HYDROMORPHONE HYDROCHLORIDE 1 MG: 2 INJECTION INTRAMUSCULAR; INTRAVENOUS; SUBCUTANEOUS at 12:29

## 2018-05-03 RX ADMIN — ONDANSETRON HYDROCHLORIDE 4 MG: 2 INJECTION INTRAMUSCULAR; INTRAVENOUS at 13:28

## 2018-05-03 NOTE — H&P
70 Daryl Zamora MD, FACP, [facility], [place]       Brian Jacinto, LIYA Holguin, NANCY Wing, ACNP-BC   Aubrey Medina, LIYA Davis [facility] 136    at [address], [address] [place]  22.    [phone]    FAX: [address] [place] [address], [address]    [phone]    FAX: [phone]       PRE-PROCEDURE NOTE - LIVER BIOPSY    H and P from last office visit reviewed. Allergies reviewed. Out-patient medication list reviewed. Patient Active Problem List   Diagnosis Code    Diastolic CHF, acute on chronic (HCC) I50.33    CAD (coronary artery disease) I25.10    Chronic airway obstruction, not elsewhere classified J44.9    HTN (hypertension), benign I10    Anemia, unspecified D64.9    Obstructive sleep apnea (adult) (pediatric) G47.33    Chronic blood loss anemia D50.0    NAFLD (nonalcoholic fatty liver disease) K76.0       Allergies   Allergen Reactions    Ciprofloxacin Unknown (comments)    Ezetimibe Unknown (comments)    Penicillins Rash    Pravastatin Unknown (comments)    Simvastatin Unknown (comments)       No current facility-administered medications on file prior to encounter. Current Outpatient Prescriptions on File Prior to Encounter   Medication Sig Dispense Refill    fluticasone furoate (ARNUITY ELLIPTA) 100 mcg/actuation dsdv inhaler Take 1 Puff by inhalation daily.  insulin lispro protamine/insulin lispro (HUMALOG MIX 50-50 INSULN U-100) 100 unit/mL (50-50) injection by SubCUTAneous route.  UMECLIDINIUM BRM/VILANTEROL TR (ANORO ELLIPTA IN) Take  by inhalation.  aspirin delayed-release 81 mg tablet Take  by mouth daily.       diltiazem (TIAZAC) 180 mg SR capsule Take 180 mg by mouth daily.  guaiFENesin (ORGANIDIN) 400 mg tablet Take  by mouth two (2) times a day.  atorvastatin (LIPITOR) 10 mg tablet Take 10 mg by mouth daily.  losartan (COZAAR) 50 mg tablet Take 50 mg by mouth daily.  metoprolol-XL (TOPROL-XL) 50 mg XL tablet Take 50 mg by mouth daily.  montelukast (SINGULAIR) 10 mg tablet Take 10 mg by mouth daily.  albuterol (VENTOLIN HFA) 90 mcg/actuation inhaler Take 2 Puffs by inhalation every four (4) hours as needed.  exenatide microspheres (BYDUREON BCISE) 2 mg/0.85 mL atIn by SubCUTAneous route.  INSULIN GLARGINE,HUM. REC. ANLOG (TOUJEO SOLOSTAR U-300 INSULIN SC) by SubCUTAneous route.  furosemide (LASIX) 40 mg tablet Take 40 mg by mouth daily.  nitroglycerin (NITROSTAT) 0.4 mg SL tablet by SubLINGual route every five (5) minutes as needed. For liver biopsy to assess NALFD. The risks of the procedure were discussed with the patient. This included bleeding, pain, and puncture of other organs. All questions were answered. The patient wishes to proceed with the procedure. PHYSICAL EXAMINATION:  VS: per nursing note  General: No acute distress. Eyes: Sclera anicteric. ENT: No oral lesions. Thyroid normal.  Nodes: No adenopathy. Skin: No spider angiomata. No jaundice. No palmar erythema. Respiratory: Lungs clear to auscultation. Cardiovascular: Regular heart rate. No murmurs. No JVD. Abdomen: Soft non-tender, liver size normal to percussion/palpation. Spleen not palpable. No obvious ascites. Extremities: No edema. No muscle wasting. No gross arthritic changes. Neurologic: Alert and oriented. Cranial nerves grossly intact. No asterixis.       LABS:  Lab Results   Component Value Date/Time    WBC 13.0 (H) 02/16/2018 12:00 AM    Hemoglobin (POC) 13.3 12/05/2012 12:23 PM    HGB 15.3 02/16/2018 12:00 AM    Hematocrit (POC) 39 12/05/2012 12:23 PM    HCT 45.5 02/16/2018 12:00 AM    PLATELET 867 58/40/2537 12:00 AM    MCV 94 02/16/2018 12:00 AM     Lab Results   Component Value Date/Time    INR 1.0 02/16/2018 12:00 AM    INR 1.0 01/11/2013 06:50 PM    INR 1.0 08/22/2012 09:05 AM    Prothrombin time 10.8 02/16/2018 12:00 AM    Prothrombin time 10.0 01/11/2013 06:50 PM    Prothrombin time 10.5 08/22/2012 09:05 AM       ASSESSMENT AND PLAN:  Liver biopsy under ultrasound guidance.     Nettie Painting MD  Liver [place] of [address] [place] [address] [address] [place] [address] [place], [address] [place]  22.  [phone]

## 2018-05-03 NOTE — PROCEDURES
70 Daryl Zamora MD, [place], [place], [place]       Roselee Ahumada, LIYA Manning, NANCY Glez, [facility]-BC   LIYA Guallpa NP [address] [facility] 136    at [address], [facility] [place]  22.    [phone]    FAX: [address] [place] [address], [address] [place] [address] - [address]    [phone]    FAX: [phone]       LIVER BIOPSY PROCEDURE NOTE    Max Montgomery  1955    INDICATIONS/PRE-OPERATIVE  DIAGNOSIS:  Elevated liver enzymes. Suspect NAFLD    : Pat Mendez MD    SEDATION: 1% Lidocaine injection 10 ml    PROCEDURE:  Informed consent to perform the procedure was obtained from the patient. The patient was positioned on the edge of the stretcher lying flat in the supine position. Ultrasound was utilized to image the liver. The diaphragm and any major mass lesion or vascular structures within the liver were identified. An appropriate site for liver biopsy was identified. The distance from the surface of the skin to the liver capsule was 3 cm. This area was prepped with betadine and draped in sterile fashion. The skin was infiltrated with 1% lidocaine. The deeper subcutanous tissues and liver capsule overlying the biopsy site were then infiltrated with 1% lidocaine until appropriate anesthesia was obtained. A small incision was made in the skin so the biopsy devise could be easily inserted. A total of 2 passes with the 18 gauge Bard biopsy devise was then made into the liver. Core(s) of liver tissue totaling 4 cm in length were obtained and placed into tissue fixative. A band aid was placed over the biopsy site.   The patient was then repositioned on the right side and transported to the recovery area on the stretcher for routine monitoring until discharge. The specimen was sent to pathology for processing via the normal transport mechanism. SPECIMEN REMOVED: Liver    ESTIMATED BLOOD LOSS: Negligible.       POST-OPERATIVE DIAGNOSIS: Same as Pre-operative Diagnosis    Kimberly Canales MD       5/3/2018  12:43 PM

## 2018-05-03 NOTE — DISCHARGE INSTRUCTIONS
70 Daryl Zamora MD, FACP, [facility] [place], [place]       LIYA Sams PA-C Marylin Arm, [facility]   LIYA Bingham NP [address] [facility] 136    at [address], [address] [place]  22.    [phone]    FAX: [address] [place] [address], [address]    [phone]    FAX: [phone]       LIVER BIOPSY DISCHARGE INSTRUCTIONS      Kerry Cruz  1955  Date: 5/3/2018    DIET:    Gustavo Dunn may resume your previous diet. ACTIVITIES:  Rest quietly the rest of today. You should not lift any objects more than 20 pounds for the next 2 days. If you work sitting down without strenuous activity you may return to work tomorrow. If you exert yourself or do heavy lifting at work you should take tomorrow off. Do not drive or operate hazardous machinery for 12 hours. SPECIAL INSTRUCTIONS:  Do not use any aspirin or non-steroidal (Motin, Advil, Naproxen, etc) pain medications for the next 3 days. You may use extra-strength Tylenol (acetaminophen) if you experience pain or discomfort later today. Call the [address] office if you experience any of the following:  Persistent or severe abdominal pain. Persistent or severe abdominal distention. Fever and chills   Nausea and vomiting. New or unusual symptoms. Follow-up care: You should have a follow up appointment with Dr. Samantha Valiente to review the results of the liver biopsy results in 2 weeks. If you do not have an appointment please call the office at the number listed above to schedule this. Other instructions:    If you have any problems or questions call the [address] [place] [address] office at the phone number listed above. DISCHARGE SUMMARY from Nurse:     The following personal items collected during your admission are returned to you:   Dental Appliance:    Vision: Visual Aid: Glasses  Hearing Aid:    Jewelry:    Clothing:    Other Valuables:    Valuables sent to safe:

## 2018-05-03 NOTE — IP AVS SNAPSHOT
[address] 13 
[phone] Patient: Monica Locke MRN: [id number] F About your hospitalization You were admitted on:  May 3, 2018 You last received care in the:  [facility] ENDOSCOPY You were discharged on:  May 3, 2018 Why you were hospitalized Your primary diagnosis was:  Not on File Follow-up Information Follow up With Details Comments Contact Info Ashleigh Dobson MD   [address] [facility] [address] [phone] Your Scheduled Appointments Thursday May 17, 2018 10:15 AM EDT Follow Up with MD Juan Jose Myrickfnarcoleman 75 ([address] [phone] Stu Benson 13  
[phone] Discharge Orders None A check jayde indicates which time of day the medication should be taken. My Medications CONTINUE taking these medications Instructions Each Dose to Equal  
 Morning Noon Evening Bedtime ANORO ELLIPTA IN Your last dose was: Your next dose is: Take  by inhalation. ARNUITY ELLIPTA 100 mcg/actuation Dsdv inhaler Generic drug:  fluticasone furoate Your last dose was: Your next dose is: Take 1 Puff by inhalation daily. 1 Puff  
    
   
   
   
  
 aspirin delayed-release 81 mg tablet Your last dose was: Your next dose is: Take  by mouth daily. atorvastatin 10 mg tablet Commonly known as:  LIPITOR Your last dose was: Your next dose is: Take 10 mg by mouth daily. 10 mg  
    
   
   
   
  
 BYDUREON BCISE 2 mg/0.85 mL Atin Generic drug:  exenatide microspheres Your last dose was: Your next dose is:    
   
   
 by SubCUTAneous route. COZAAR 50 mg tablet Generic drug:  losartan Your last dose was: Your next dose is: Take 50 mg by mouth daily. 50 mg  
    
   
   
   
  
 dilTIAZem 180 mg SR capsule Commonly known as:  [facility] Your last dose was: Your next dose is: Take 180 mg by mouth daily. 180 mg  
    
   
   
   
  
 guaiFENesin 400 mg tablet Commonly known as:  [place] Your last dose was: Your next dose is: Take  by mouth two (2) times a day. HumaLOG Mix 50-50 Insuln U-100 100 unit/mL (50-50) injection Generic drug:  insulin lispro protamine/insulin lispro Your last dose was: Your next dose is:    
   
   
 by SubCUTAneous route. LASIX 40 mg tablet Generic drug:  furosemide Your last dose was: Your next dose is: Take 40 mg by mouth daily. 40 mg  
    
   
   
   
  
 metoprolol succinate 50 mg XL tablet Commonly known as:  TOPROL-XL Your last dose was: Your next dose is: Take 50 mg by mouth daily. 50 mg NITROSTAT 0.4 mg SL tablet Generic drug:  nitroglycerin Your last dose was: Your next dose is:    
   
   
 by SubLINGual route every five (5) minutes as needed. PREVACID PO Your last dose was: Your next dose is: Take  by mouth. SINGULAIR 10 mg tablet Generic drug:  montelukast  
   
Your last dose was: Your next dose is: Take 10 mg by mouth daily. 10 mg  
    
   
   
   
  
 TOUJEO SOLOSTAR U-300 INSULIN SC Your last dose was: Your next dose is:    
   
   
 by SubCUTAneous route. VENTOLIN HFA 90 mcg/actuation inhaler Generic drug:  albuterol Your last dose was: Your next dose is: Take 2 Puffs by inhalation every four (4) hours as needed. 2 Puff Discharge Instructions [address] [facility],[address] [facility] Diane Groves MD, Migue Sharp, ALVAREZSLD Olesya Morris, NANCY Casillas, [facility]   Amber Alejo, LIYA Deluca, LIYA Lowe [facility] 136 
  at [address], [facility] 2001 Shabana Loomis  22. 
  [phone] FAX: [address] [place] [address] Avenue 
  [facility] 
  [address], [address] [address] [facility], [address] 
  [phone] FAX: [phone] LIVER BIOPSY DISCHARGE INSTRUCTIONS Jeffery Lee 1955 Date: 5/3/2018 DIET:   
You may resume your previous diet. ACTIVITIES: 
Rest quietly the rest of today. You should not lift any objects more than 20 pounds for the next 2 days. If you work sitting down without strenuous activity you may return to work tomorrow. If you exert yourself or do heavy lifting at work you should take tomorrow off. Do not drive or operate hazardous machinery for 12 hours. SPECIAL INSTRUCTIONS: 
Do not use any aspirin or non-steroidal (Motin, Advil, Naproxen, etc) pain medications for the next 3 days. You may use extra-strength Tylenol (acetaminophen) if you experience pain or discomfort later today. Call the [address] office if you experience any of the following: 
Persistent or severe abdominal pain. Persistent or severe abdominal distention. Fever and chills Nausea and vomiting. New or unusual symptoms. Follow-up care: You should have a follow up appointment with Dr. Nika Landry to review the results of the liver biopsy results in 2 weeks. If you do not have an appointment please call the office at the number listed above to schedule this. Other instructions: If you have any problems or questions call the [address] office at the phone number listed above. DISCHARGE SUMMARY from Nurse: The following personal items collected during your admission are returned to you:  
Dental Appliance:   
Vision: Visual Aid: Glasses Hearing Aid:   
Jewelry:   
Clothing:   
Other Valuables:   
Valuables sent to safe:   
 
 
  
  
  
Introducing [facility] [place] [facility]! [facility] introduces LinguaNext patient portal. Now you can access parts of your medical record, email your doctor's office, and request medication refills online. 1. In your internet browser, go to [email or web address] 2. Click on the First Time User? Click Here link in the Sign In box. You will see the New Member Sign Up page. 3. Enter your LinguaNext Access Code exactly as it appears below. You will not need to use this code after youve completed the sign-up process. If you do not sign up before the expiration date, you must request a new code. · LinguaNext Access Code: [email or web address] Expires: 5/17/2018  5:06 PM 
 
4. Enter the last four digits of your Social Security Number (xxxx) and Date of Birth (mm/dd/yyyy) as indicated and click Submit. You will be taken to the next sign-up page. 5. Create a LinguaNext ID. This will be your LinguaNext login ID and cannot be changed, so think of one that is secure and easy to remember. 6. Create a LinguaNext password. You can change your password at any time. 7. Enter your Password Reset Question and Answer. This can be used at a later time if you forget your password. 8. Enter your e-mail address. You will receive e-mail notification when new information is available in [address] 9. Click Sign Up. You can now view and download portions of your medical record. 10. Click the Download Summary menu link to download a portable copy of your medical information.  
 
If you have questions, please visit the Frequently Asked Questions section of the [employer]. Remember, MyChart is NOT to be used for urgent needs. For medical emergencies, dial 911. Now available from your iPhone and Android! Introducing Steve Reid As a [employer] patient, I wanted to make you aware of our electronic visit tool called Steve Reid. [employer] 24/7 allows you to connect within minutes with a medical provider 24 hours a day, seven days a week via a mobile device or tablet or logging into a secure website from your computer. You can access Steve Reid from anywhere in the [place]. A virtual visit might be right for you when you have a simple condition and feel like you just dont want to get out of bed, or cant get away from work for an appointment, when your regular [employer] provider is not available (evenings, weekends or holidays), or when youre out of town and need minor care. Electronic visits cost only $49 and if the [employer] 24/7 provider determines a prescription is needed to treat your condition, one can be electronically transmitted to a nearby pharmacy*. Please take a moment to enroll today if you have not already done so. The enrollment process is free and takes just a few minutes. To enroll, please download the [employer] 24/7 rhonda to your tablet or phone, or visit [email or web address] to enroll on your computer. And, as an [address] [place] [address] patient with a [employer] account, the results of your visits will be scanned into your electronic medical record and your primary care provider will be able to view the scanned results. We urge you to continue to see your regular [employer] provider for your ongoing medical care. And while your primary care provider may not be the one available when you seek a Steve Reid virtual visit, the peace of mind you get from getting a real diagnosis real time can be priceless. For more information on Steve Reid, view our Frequently Asked Questions (FAQs) at [email or web address]. Sincerely, 
 
Jaycee Tamez MD 
Chief Medical Officer [address] *:  certain medications cannot be prescribed via Steve Reid Unresulted Labs-Please follow up with your PCP about these lab tests Order Current Status [facility] In process Providers Seen During Your Hospitalization Provider Specialty Primary office phone Michelle Grande MD Hepatology [phone] Your Primary Care Physician (PCP) Primary Care Physician Office Phone Office Fax Frankey Erik [phone] You are allergic to the following Allergen Reactions Ciprofloxacin Unknown (comments) Ezetimibe Unknown (comments) Penicillins Rash Pravastatin Unknown (comments) Simvastatin Unknown (comments) Recent Documentation Weight BMI Smoking Status 104.3 kg 37.12 kg/m2 Former Smoker Emergency Contacts Name Discharge Info Relation Home Work Mobile Alfonzo Jaime CAREGIVER [3] Spouse [3] [phone] Patient Belongings The following personal items are in your possession at time of discharge: 
     Visual Aid: Glasses Please provide this summary of care documentation to your next provider. Signatures-by signing, you are acknowledging that this After Visit Summary has been reviewed with you and you have received a copy. Patient Signature:  ____________________________________________________________ Date:  ____________________________________________________________  
  
Ritchie Cart Provider Signature:  ____________________________________________________________ Date:  ____________________________________________________________

## 2018-05-03 NOTE — PROGRESS NOTES
Pt still complaining of pain in the R shoulder, 1 mg of Dilaudid given. Pt able to tolerate liquids.

## 2018-05-17 ENCOUNTER — OFFICE VISIT (OUTPATIENT)
Dept: HEMATOLOGY | Age: 63
End: 2018-05-17

## 2018-05-17 VITALS
DIASTOLIC BLOOD PRESSURE: 67 MMHG | HEIGHT: 69 IN | SYSTOLIC BLOOD PRESSURE: 121 MMHG | WEIGHT: 229.4 LBS | TEMPERATURE: 97.8 F | BODY MASS INDEX: 33.98 KG/M2 | HEART RATE: 71 BPM | OXYGEN SATURATION: 96 %

## 2018-05-17 DIAGNOSIS — K74.60 CIRRHOSIS OF LIVER WITHOUT ASCITES, UNSPECIFIED HEPATIC CIRRHOSIS TYPE (HCC): Primary | ICD-10-CM

## 2018-05-17 PROBLEM — E11.9 TYPE II DIABETES MELLITUS (HCC): Status: ACTIVE | Noted: 2018-05-17

## 2018-05-17 NOTE — PROGRESS NOTES
Chief Complaint   Patient presents with    Follow-up     LBX follow up     Visit Vitals    /67 (BP 1 Location: Left arm, BP Patient Position: Sitting)    Pulse 71    Temp 97.8 °F (36.6 °C) (Tympanic)    Ht 5' 9\" (1.753 m)    Wt 229 lb 6.4 oz (104.1 kg)    SpO2 96%    BMI 33.88 kg/m2     PHQ over the last two weeks 3/29/2018   Little interest or pleasure in doing things Not at all   Feeling down, depressed or hopeless Not at all   Total Score PHQ 2 0

## 2018-05-17 NOTE — PROGRESS NOTES
70 Daryl Zamora MD, [address], [place], [place]       April Adrian Lopez, LIYA Martin, PAJevonC    Tita Min, [facility]   LIYA Roberto NP [facility] 136    at [address], [address] [place] [address], [facility] 22.    [phone]    FAX: [address] [place] [address]    at [address], [address] [place] [address],[address]    [address] [place] [address] Street: [phone]         Patient Care Team:  Paola Andrew MD as PCP - General (Geriatric Medicine)      Problem List  Date Reviewed: 3/31/2018          Codes Class Noted    NAFLD (nonalcoholic fatty liver disease) ICD-10-CM: K76.0  ICD-9-CM: 571.8  2/16/2018        Chronic blood loss anemia ICD-10-CM: D50.0  ICD-9-CM: 280.0  1/12/2013        Chronic airway obstruction, not elsewhere classified ICD-10-CM: J44.9  ICD-9-CM: 496  1/11/2013        HTN (hypertension), benign ICD-10-CM: I10  ICD-9-CM: 401.1  1/11/2013        Anemia, unspecified ICD-10-CM: D64.9  ICD-9-CM: 285.9  1/11/2013        Obstructive sleep apnea (adult) (pediatric) ICD-10-CM: G47.33  ICD-9-CM: 327.23  2/85/4160        Diastolic CHF, acute on chronic [facility]) ICD-10-CM: I50.33  ICD-9-CM: 428.33, 428.0  8/23/2012        CAD (coronary artery disease) ICD-10-CM: I25.10  ICD-9-CM: 414.00  8/23/2012              Janice Liang returns to the [address] for management of non-alcoholic fatty liver disease (NAFLD). The active problem list, all pertinent past medical history, medications, radiologic findings and laboratory findings related to the liver disorder were reviewed with the patient. The patient is a 60 y.o.  male who was found to have elevated liver enzymes in 2017.     Serologic evaluation for causes of chronic liver disease were negative. The patient underwent a liver biopsy in 5/2018. The procedure was well tolerated. I have personally reviewed the liver biopsy slides. This demonstrates minimal steatosis and cirrhosis. The patient has no symptoms which could be attributed to the liver disorder. The patient completes all daily activities without any functional limitations. The patient has not experienced fatigue, pain in the right side over the liver,       ALLERGIES  Allergies   Allergen Reactions    Ciprofloxacin Unknown (comments)    Ezetimibe Unknown (comments)    Penicillins Rash    Pravastatin Unknown (comments)    Simvastatin Unknown (comments)       MEDICATIONS  Current Outpatient Prescriptions   Medication Sig    lansoprazole (PREVACID PO) Take  by mouth.  fluticasone furoate (ARNUITY ELLIPTA) 100 mcg/actuation dsdv inhaler Take 1 Puff by inhalation daily.  exenatide microspheres (BYDUREON BCISE) 2 mg/0.85 mL atIn by SubCUTAneous route.  insulin lispro protamine/insulin lispro (HUMALOG MIX 50-50 INSULN U-100) 100 unit/mL (50-50) injection by SubCUTAneous route.  UMECLIDINIUM BRM/VILANTEROL TR (ANORO ELLIPTA IN) Take  by inhalation.  INSULIN GLARGINE,HUM. REC. ANLOG (TOUJEO SOLOSTAR U-300 INSULIN SC) by SubCUTAneous route.  aspirin delayed-release 81 mg tablet Take  by mouth daily.  diltiazem (TIAZAC) 180 mg SR capsule Take 180 mg by mouth daily.  guaiFENesin (ORGANIDIN) 400 mg tablet Take  by mouth two (2) times a day.  atorvastatin (LIPITOR) 10 mg tablet Take 10 mg by mouth daily.  losartan (COZAAR) 50 mg tablet Take 50 mg by mouth daily.  metoprolol-XL (TOPROL-XL) 50 mg XL tablet Take 50 mg by mouth daily.  nitroglycerin (NITROSTAT) 0.4 mg SL tablet by SubLINGual route every five (5) minutes as needed.  montelukast (SINGULAIR) 10 mg tablet Take 10 mg by mouth daily.       albuterol (VENTOLIN HFA) 90 mcg/actuation inhaler Take 2 Puffs by inhalation every four (4) hours as needed.  furosemide (LASIX) 40 mg tablet Take 40 mg by mouth daily. No current facility-administered medications for this visit. SYSTEM REVIEW NOT RELATED TO LIVER DISEASE OR REVIEWED ABOVE:  Constitution systems: Negative for fever, chills, weight gain, weight loss. Eyes: Negative for visual changes. ENT: Negative for sore throat, painful swallowing. Respiratory: Negative for cough, hemoptysis, SOB. Cardiology: Negative for chest pain, palpitations. GI:  Negative for constipation or diarrhea. : Negative for urinary frequency, dysuria, hematuria, nocturia. Skin: Negative for rash. Hematology: Negative for easy bruising, blood clots. Musculo-skelatal: Negative for back pain, muscle pain, weakness. Neurologic: Negative for headaches, dizziness, vertigo, memory problems not related to HE. Psychology: Negative for anxiety, depression. FAMILY HISTORY:  The father  of dementia. The mother has the following chronic diseases: dementia. There is no family history of liver disease. SOCIAL HISTORY:  The patient is . The patient has 2 children, and 3 grandchildren. The patient stopped using tobacco products in 10/1998. The patient has previously consumed alcohol socially never in excess. The patient has been abstinent from alcohol since 2014. The patient used to work as a muscician. Jake Quiet PHYSICAL EXAMINATION:  Visit Vitals    /67 (BP 1 Location: Left arm, BP Patient Position: Sitting)    Pulse 71    Temp 97.8 °F (36.6 °C) (Tympanic)    Ht 5' 9\" (1.753 m)    Wt 229 lb 6.4 oz (104.1 kg)    SpO2 96%    BMI 33.88 kg/m2     General: No acute distress. Eyes: Sclera anicteric. ENT: No oral lesions. Thyroid normal.  Nodes: No adenopathy. Skin: No spider angiomata. No jaundice. No palmar erythema. Respiratory: Lungs clear to auscultation. Cardiovascular: Regular heart rate. No murmurs.   No JVD.  Abdomen: Soft non-tender. Liver size normal to percussion/palpation. Spleen not palpable. No obvious ascites. Extremities: No edema. No muscle wasting. No gross arthritic changes. Neurologic: Alert and oriented. Cranial nerves grossly intact. No asterixis. LABORATORY STUDIES:  Liver [place] of [address] Units 2/16/2018   WBC 3.4 - 10.8 x10E3/uL 13.0 (H)   ANC 1.4 - 7.0 x10E3/uL 7.5 (H)   HGB 13.0 - 17.7 g/dL 15.3    - 379 x10E3/uL 166   INR 0.8 - 1.2 1.0   AST 0 - 40 IU/L 49 (H)   ALT 0 - 44 IU/L 53 (H)   Alk Phos 39 - 117 IU/L 106   Bili, Total 0.0 - 1.2 mg/dL 0.2   Bili, Direct 0.00 - 0.40 mg/dL 0.11   Albumin 3.6 - 4.8 g/dL 4.0   BUN 8 - 27 mg/dL 18   Creat 0.76 - 1.27 mg/dL 0.78   Na 134 - 144 mmol/L 139   K 3.5 - 5.2 mmol/L 4.1   Cl 96 - 106 mmol/L 100   CO2 18 - 29 mmol/L 22   Glucose 65 - 99 mg/dL 144 (H)     SEROLOGIES:  Serologies Latest Ref Rng & Units 2/16/2018   Hep A Ab, Total Negative Negative   Hep B Surface Ag Negative Negative   Hep B Core Ab, Total Negative Negative   Hep B Surface AB QL  Non Reactive   Hep C Ab 0.0 - 0.9 s/co ratio <0.1   Ferritin 30 - 400 ng/mL 31   Iron % Saturation 15 - 55 % 22   LAUREN, IFA  Negative   ASMCA 0 - 19 Units 11   Alpha-1 antitrypsin level 90 - 200 mg/dL 170     LIVER HISTOLOGY:  3/2018. FibroScan performed at [facility]. EkPa was 31.2. IQR/med 56%. . Consistent with ALFONSO and cirrhosis. 5/2018. Slides reviewed by MLS. Minimal portal inflammation, mild steaosis, no ballooning. Cirrhosis. ENDOSCOPIC PROCEDURES:  Not available or performed    RADIOLOGY:  12/2017. Ultrasound of liver. Normal appearing liver. No liver mass lesions. OTHER TESTING:  Not available or performed    ASSESSMENT AND PLAN:  Cirrhosis that is likely do to ALFONSO   This is based upon Fiboscan with a high CAP and liver stiffness values.     The biopsy shows only mild steaosis but with cirrhosis    Liver transaminases are elevated. Liver function is normal.  The platelet count is normal.      The patient was counseled regarding diet and exercise to achieve weight loss. The best diet for patients with fatty liver is one very low in carbohydrates and enriched with protein such as an Cheyanne's program.      The patient was told to to consume any food products and drinks containing fructose as this enhances hepatic fat synthesis. There is no medication of vitamin supplements that we advocate for ALFONSO. Using glitazones in patients without diabetes mellitus has been shown to reduce fat content in the liver but has no effect on fibrosis and is associated with weight gain. Vitamin E has also been used but the data is not very good and most experts no longer advocate this. The only medical treatments for ALFONSO are though clinical trials. The patient was offered participation in a clinical trial for treatment of ALFONSO. The patient would like to particiapte in a clinical trial.    The patient was directed to continue all current medications at the current dosages. There are no contraindications for the patient to take any medications that are necessary for treatment of other medical issues including medications for diabetes mellitus and hypercholesterolemia. The patient was counseled regarding alcohol consumption and that this could contribute to fatty liver disease. Vaccination for viral hepatitis A and B is recommended since the patient has no serologic evidence of previous exposure or vaccination with immunity. [address] screening has recently been performed and does not suggest [address] The next liver imaging study will be performed in 6/2018. All of the above issues were discussed with the patient. All questions were answered. The patient expressed a clear understanding of the above. [facility] 87 in 3 months.       Anoop Sotomayor MD  Liver [place] of [address] [place] [address] Joseph Cisneros 7  Shabana Martínez  22.  [phone]

## 2018-05-17 NOTE — MR AVS SNAPSHOT
[address] [facility] [phone] [address] [place] [address] 
[phone] Patient: Ismael Zhong MRN: [id number] WMH:2/27/[birth year]42 Visit Information Date & Time Provider Department Dept. Phone Encounter #  
 5/17/2018 10:15 AM Delmar Petit [facility] [place] 219 [id number] Your Appointments 6/15/2018  9:30 AM  
Follow Up with FADI Torres 75 ([facility] Appt Note: Follow up [address] [place] [facility] [phone] [facility] [address]  
59 [facility] [address] Upcoming Health Maintenance Date Due DTaP/Tdap/Td series (1 - Tdap) 5/20/1975 FOBT Q 1 YEAR AGE 50-75 5/20/2004 ZOSTER VACCINE AGE 60> 3/20/2014 Influenza Age 5 to Adult 8/1/2018 Allergies as of 5/17/2018  Review Complete On: 5/17/2018 By: Fausto Santos LPN Severity Noted Reaction Type Reactions Ciprofloxacin  08/22/2012    Unknown (comments) Ezetimibe  08/22/2012    Unknown (comments) Penicillins  08/22/2012    Rash Pravastatin  08/22/2012    Unknown (comments) Simvastatin  08/22/2012    Unknown (comments) Current Immunizations  Never Reviewed Name Date ZZZ-RETIRED (DO NOT USE) Pneumococcal Vaccine (Unspecified Type) 1/1/2001 Not reviewed this visit Vitals BP Pulse Temp Height(growth percentile) 121/67 (BP 1 Location: Left arm, BP Patient Position: Sitting) 71 97.8 °F (36.6 °C) (Tympanic) 5' 9\" (1.753 m) Weight(growth percentile) SpO2 BMI Smoking Status 229 lb 6.4 oz (104.1 kg) 96% 33.88 kg/m2 Former Smoker Vitals History BMI and BSA Data Body Mass Index Body Surface Area  
 33.88 kg/m 2 2.25 m 2 Your Updated Medication List  
  
   
This list is accurate as of 5/17/18 11:16 AM.  Always use your most recent med list.  
  
  
  
  
 Margo Lawrence Take  by inhalation. ARNUITY ELLIPTA 100 mcg/actuation Dsdv inhaler Generic drug:  fluticasone furoate Take 1 Puff by inhalation daily. aspirin delayed-release 81 mg tablet Take  by mouth daily. atorvastatin 10 mg tablet Commonly known as:  LIPITOR Take 10 mg by mouth daily. BYDUREON BCISE 2 mg/0.85 mL Atin Generic drug:  exenatide microspheres  
by SubCUTAneous route. COZAAR 50 mg tablet Generic drug:  losartan Take 50 mg by mouth daily. dilTIAZem 180 mg SR capsule Commonly known as:  [facility] Take 180 mg by mouth daily. guaiFENesin 400 mg tablet Commonly known as:  Rhetta Ottawa Take  by mouth two (2) times a day. HumaLOG Mix 50-50 Insuln U-100 100 unit/mL (50-50) injection Generic drug:  insulin lispro protamine/insulin lispro  
by SubCUTAneous route. LASIX 40 mg tablet Generic drug:  furosemide Take 40 mg by mouth daily. metoprolol succinate 50 mg XL tablet Commonly known as:  TOPROL-XL Take 50 mg by mouth daily. NITROSTAT 0.4 mg SL tablet Generic drug:  nitroglycerin  
by SubLINGual route every five (5) minutes as needed. PREVACID PO Take  by mouth. SINGULAIR 10 mg tablet Generic drug:  montelukast  
Take 10 mg by mouth daily. TOUJEO SOLOSTAR U-300 INSULIN SC  
by SubCUTAneous route. VENTOLIN HFA 90 mcg/actuation inhaler Generic drug:  albuterol Take 2 Puffs by inhalation every four (4) hours as needed. Introducing [facility] & HEALTH SERVICES! [facility] introduces [employer] patient portal. Now you can access parts of your medical record, email your doctor's office, and request medication refills online. 1. In your internet browser, go to [email or web address] 2. Click on the First Time User? Click Here link in the Sign In box. You will see the New Member Sign Up page. 3. Enter your [employer] Access Code exactly as it appears below.  You will not need to use this code after youve completed the sign-up process. If you do not sign up before the expiration date, you must request a new code. · [employer] Access Code: [email or web address] Expires: 5/17/2018  5:06 PM 
 
4. Enter the last four digits of your Social Security Number (xxxx) and Date of Birth (mm/dd/yyyy) as indicated and click Submit. You will be taken to the next sign-up page. 5. Create a [employer] ID. This will be your [employer] login ID and cannot be changed, so think of one that is secure and easy to remember. 6. Create a [employer] password. You can change your password at any time. 7. Enter your Password Reset Question and Answer. This can be used at a later time if you forget your password. 8. Enter your e-mail address. You will receive e-mail notification when new information is available in [address]. 9. Click Sign Up. You can now view and download portions of your medical record. 10. Click the Download Summary menu link to download a portable copy of your medical information. If you have questions, please visit the Frequently Asked Questions section of the [employer] website. Remember, [employer] is NOT to be used for urgent needs. For medical emergencies, dial 911. Now available from your iPhone and Android! Please provide this summary of care documentation to your next provider. Your primary care clinician is listed as Johnny [address]. If you have any questions after today's visit, please call [phone].

## 2018-06-15 ENCOUNTER — OFFICE VISIT (OUTPATIENT)
Dept: HEMATOLOGY | Age: 63
End: 2018-06-15

## 2018-06-15 VITALS
OXYGEN SATURATION: 95 % | SYSTOLIC BLOOD PRESSURE: 121 MMHG | TEMPERATURE: 96.7 F | HEART RATE: 65 BPM | DIASTOLIC BLOOD PRESSURE: 60 MMHG | WEIGHT: 227 LBS | BODY MASS INDEX: 33.52 KG/M2

## 2018-06-15 DIAGNOSIS — K74.60 CIRRHOSIS OF LIVER WITHOUT ASCITES, UNSPECIFIED HEPATIC CIRRHOSIS TYPE (HCC): Primary | ICD-10-CM

## 2018-06-15 RX ORDER — ERGOCALCIFEROL 1.25 MG/1
50000 CAPSULE ORAL
COMMUNITY

## 2018-06-15 RX ORDER — METFORMIN HYDROCHLORIDE 500 MG/1
TABLET, EXTENDED RELEASE ORAL
Refills: 1 | COMMUNITY
Start: 2018-06-12 | End: 2019-07-24

## 2018-06-15 NOTE — MR AVS SNAPSHOT
[address] [facility] [id number] Stu Benson 13 
[phone] Patient: Gita Worthington MRN: [id number] ZID:2/73/1[birth year] Visit Information Date & Time Provider Department Dept. Phone Encounter #  
 6/15/2018  9:30 AM Alvaro Montelongo, [address] [place] [address] [id number] Upcoming Health Maintenance Date Due  
 FOOT EXAM Q1 5/20/1964 MICROALBUMIN Q1 5/20/1964 EYE EXAM RETINAL OR DILATED Q1 5/20/1964 DTaP/Tdap/Td series (1 - Tdap) 5/20/1975 FOBT Q 1 YEAR AGE 50-75 5/20/2004 LIPID PANEL Q1 8/23/2013 ZOSTER VACCINE AGE 60> 3/20/2014 HEMOGLOBIN A1C Q6M 6/13/2018 Influenza Age 5 to Adult 8/1/2018 Allergies as of 6/15/2018  Review Complete On: 6/15/2018 By: Jagjit Harley Severity Noted Reaction Type Reactions Ciprofloxacin  08/22/2012    Unknown (comments) Ezetimibe  08/22/2012    Unknown (comments) Penicillins  08/22/2012    Rash Pravastatin  08/22/2012    Unknown (comments) Simvastatin  08/22/2012    Unknown (comments) Current Immunizations  Never Reviewed Name Date ZZZ-RETIRED (DO NOT USE) Pneumococcal Vaccine (Unspecified Type) 1/1/2001 Not reviewed this visit You Were Diagnosed With   
  
 Codes Comments Cirrhosis of liver without ascites, unspecified hepatic cirrhosis type ([facility].)    -  Primary ICD-10-CM: K74.60 ICD-9-CM: 571.5 Vitals BP Pulse Temp Weight(growth percentile) SpO2 BMI  
 121/60 (BP 1 Location: Left arm, BP Patient Position: Sitting) 65 96.7 °F (35.9 °C) (Tympanic) 227 lb (103 kg) 95% 33.52 kg/m2 Smoking Status Former Smoker BMI and BSA Data Body Mass Index Body Surface Area  
 33.52 kg/m 2 2.24 m 2 Your Updated Medication List  
  
   
This list is accurate as of 6/15/18  9:56 AM.  Always use your most recent med list.  
  
  
  
  
 Debera Faes Take  by inhalation. ARNUITY ELLIPTA 100 mcg/actuation Dsdv inhaler Generic drug:  fluticasone furoate Take 1 Puff by inhalation daily. aspirin delayed-release 81 mg tablet Take  by mouth daily. atorvastatin 10 mg tablet Commonly known as:  LIPITOR Take 5 mg by mouth daily. BYDUREON BCISE 2 mg/0.85 mL Atin Generic drug:  exenatide microspheres  
by SubCUTAneous route. COZAAR 50 mg tablet Generic drug:  losartan Take 50 mg by mouth daily. dilTIAZem 180 mg SR capsule Commonly known as:  [facility] Take 180 mg by mouth daily. guaiFENesin 400 mg tablet Commonly known as:  Rene Gails Take  by mouth two (2) times a day. HumaLOG Mix 50-50 Insuln U-100 100 unit/mL (50-50) injection Generic drug:  insulin lispro protamine/insulin lispro  
by SubCUTAneous route. LASIX 40 mg tablet Generic drug:  furosemide Take 40 mg by mouth daily. metFORMIN  mg tablet Commonly known as:  GLUCOPHAGE XR  
TAKE ONE TABLET BY MOUTH EVERY DAY WITH evening meal  
  
 metoprolol succinate 50 mg XL tablet Commonly known as:  TOPROL-XL Take 50 mg by mouth daily. NITROSTAT 0.4 mg SL tablet Generic drug:  nitroglycerin  
by SubLINGual route every five (5) minutes as needed. PREVACID PO Take  by mouth. SINGULAIR 10 mg tablet Generic drug:  montelukast  
Take 10 mg by mouth daily. TOUJEO SOLOSTAR U-300 INSULIN SC  
by SubCUTAneous route. VENTOLIN HFA 90 mcg/actuation inhaler Generic drug:  albuterol Take 2 Puffs by inhalation every four (4) hours as needed. VITAMIN D2 50,000 unit capsule Generic drug:  ergocalciferol Take 50,000 Units by mouth. We Performed the Following AFP WITH AFP-L3% [[id number] Custom] CBC W/O DIFF [57680 CPT(R)] HEPATIC FUNCTION PANEL (6) [[id number] Custom] METABOLIC PANEL, BASIC [62051 CPT(R)] PROTHROMBIN TIME + INR [13707 CPT(R)] UPPER GI ENDOSCOPY,DIAGNOSIS [57970 CPT(R)] Comments:  
 Schedule with Dr. Kaushik Villalta To-Do List   
 06/28/2018 Imaging:  US ABD COMP Introducing \[facility]\"" & HEALTH SERVICES! Mariana Schafer introduces [employer] patient portal. Now you can access parts of your medical record, email your doctor's office, and request medication refills online. 1. In your internet browser, go to [email or web address] 2. Click on the First Time User? Click Here link in the Sign In box. You will see the New Member Sign Up page. 3. Enter your [employer] Access Code exactly as it appears below. You will not need to use this code after youve completed the sign-up process. If you do not sign up before the expiration date, you must request a new code. · [employer] Access Code: [email or web address] Expires: 9/13/2018  9:56 AM 
 
4. Enter the last four digits of your Social Security Number (xxxx) and Date of Birth (mm/dd/yyyy) as indicated and click Submit. You will be taken to the next sign-up page. 5. Create a [employer] ID. This will be your [employer] login ID and cannot be changed, so think of one that is secure and easy to remember. 6. Create a [employer] password. You can change your password at any time. 7. Enter your Password Reset Question and Answer. This can be used at a later time if you forget your password. 8. Enter your e-mail address. You will receive e-mail notification when new information is available in [address] 9. Click Sign Up. You can now view and download portions of your medical record. 10. Click the Download Summary menu link to download a portable copy of your medical information. If you have questions, please visit the Frequently Asked Questions section of the [employer] website. Remember, [employer] is NOT to be used for urgent needs. For medical emergencies, dial 911. Now available from your iPhone and Android! Please provide this summary of care documentation to your next provider. Your primary care clinician is listed as Martin Johnston. If you have any questions after today's visit, please call [phone].

## 2018-06-15 NOTE — PROGRESS NOTES
1. Have you been to the ER, urgent care clinic since your last visit? Hospitalized since your last visit? No    2. Have you seen or consulted any other health care providers outside of the [facility] since your last visit? Include any pap smears or colon screening. No   Chief Complaint   Patient presents with    Follow-up     Visit Vitals    /60 (BP 1 Location: Left arm, BP Patient Position: Sitting)    Pulse 65    Temp 96.7 °F (35.9 °C) (Tympanic)    Wt 227 lb (103 kg)    SpO2 95%    BMI 33.52 kg/m2     PHQ over the last two weeks 6/15/2018   Little interest or pleasure in doing things Not at all   Feeling down, depressed or hopeless Not at all   Total Score PHQ 2 0     Learning Assessment 6/15/2018   PRIMARY LEARNER Patient   BARRIERS PRIMARY LEARNER NONE   CO-LEARNER CAREGIVER No   PRIMARY LANGUAGE ENGLISH   LEARNER PREFERENCE PRIMARY LISTENING   ANSWERED BY patient    RELATIONSHIP SELF     Abuse Screening Questionnaire 6/15/2018   Do you ever feel afraid of your partner? N   Are you in a relationship with someone who physically or mentally threatens you? N   Is it safe for you to go home?  Rodriguez Terry

## 2018-06-15 NOTE — PROGRESS NOTES
70 Daryl Zamora MD, [address], [place], [place]       April Nathanael April, NANCY Johnson, [facility]   Vero Spivey, LIYA Arias [facility] [address]    at [address], [address] [facility].    [phone]    FAX: [address] [place] [address] Avenue    at [facility], [address] [place] [address], [address] [facility],[address]    [address] [facility] [address]: [phone]         Patient Care Team:  Blas Valencia MD as PCP - General (Geriatric Medicine)      Problem List  Date Reviewed: 3/31/2018          Codes Class Noted    Type II diabetes mellitus ([facility] ICD-10-CM: E11.9  ICD-9-CM: 250.00  5/17/2018        NAFLD (nonalcoholic fatty liver disease) ICD-10-CM: K76.0  ICD-9-CM: 571.8  2/16/2018        Chronic blood loss anemia ICD-10-CM: D50.0  ICD-9-CM: 280.0  1/12/2013        Chronic airway obstruction, not elsewhere classified ICD-10-CM: J44.9  ICD-9-CM: 496  1/11/2013        HTN (hypertension), benign ICD-10-CM: I10  ICD-9-CM: 401.1  1/11/2013        Anemia, unspecified ICD-10-CM: D64.9  ICD-9-CM: 285.9  1/11/2013        Obstructive sleep apnea (adult) (pediatric) ICD-10-CM: G47.33  ICD-9-CM: 327.23  7/96/6870        Diastolic CHF, acute on chronic [facility]) ICD-10-CM: I50.33  ICD-9-CM: 428.33, 428.0  8/23/2012        CAD (coronary artery disease) ICD-10-CM: I25.10  ICD-9-CM: 414.00  8/23/2012              Leandro Jacobs returns to the [address] for management of non-alcoholic fatty liver disease (NAFLD). The active problem list, all pertinent past medical history, medications, radiologic findings and laboratory findings related to the liver disorder were reviewed with the patient. The patient is a 58 y.o.   male who was found to have elevated liver enzymes in 2017 and is suspected to have fatty liver disease. Serologic evaluation for causes of chronic liver disease were negative. Ultrasound of the liver was performed in 12/2017. The results of the imaging suggested the liver was normal.      Assessment of liver fibrosis was performed in the office today with Fibroscan. The result was 31.2 kPa which correlates with cirrhosis. He has since had liver biopsy without complication which confirmed 10-25% steatosis and cirrhosis. The most recent laboratory studies indicate that the liver transaminases are elevated, ALP is normal, tests of hepatic synthetic and metabolic function are normal, and the platelet count is normal.    The patient has no symptoms which could be attributed to the liver disorder. The patient completes all daily activities without any functional limitations. The patient has not experienced fatigue, pain in the right side over the liver. ALLERGIES  Allergies   Allergen Reactions    Ciprofloxacin Unknown (comments)    Ezetimibe Unknown (comments)    Penicillins Rash    Pravastatin Unknown (comments)    Simvastatin Unknown (comments)       MEDICATIONS  Current Outpatient Prescriptions   Medication Sig    metFORMIN ER (GLUCOPHAGE XR) 500 mg tablet TAKE ONE TABLET BY MOUTH EVERY DAY WITH evening meal    ergocalciferol (VITAMIN D2) 50,000 unit capsule Take 50,000 Units by mouth.  lansoprazole (PREVACID PO) Take  by mouth.  fluticasone furoate (ARNUITY ELLIPTA) 100 mcg/actuation dsdv inhaler Take 1 Puff by inhalation daily.  exenatide microspheres (BYDUREON BCISE) 2 mg/0.85 mL atIn by SubCUTAneous route.  insulin lispro protamine/insulin lispro (HUMALOG MIX 50-50 INSULN U-100) 100 unit/mL (50-50) injection by SubCUTAneous route.  UMECLIDINIUM BRM/VILANTEROL TR (ANORO ELLIPTA IN) Take  by inhalation.  INSULIN GLARGINE,HUM. REC. ANLOG (TOUJEO SOLOSTAR U-300 INSULIN SC) by SubCUTAneous route.     aspirin delayed-release 81 mg tablet Take  by mouth daily.  diltiazem (TIAZAC) 180 mg SR capsule Take 180 mg by mouth daily.  atorvastatin (LIPITOR) 10 mg tablet Take 5 mg by mouth daily.  losartan (COZAAR) 50 mg tablet Take 50 mg by mouth daily.  metoprolol-XL (TOPROL-XL) 50 mg XL tablet Take 50 mg by mouth daily.  nitroglycerin (NITROSTAT) 0.4 mg SL tablet by SubLINGual route every five (5) minutes as needed.  montelukast (SINGULAIR) 10 mg tablet Take 10 mg by mouth daily.  albuterol (VENTOLIN HFA) 90 mcg/actuation inhaler Take 2 Puffs by inhalation every four (4) hours as needed.  furosemide (LASIX) 40 mg tablet Take 40 mg by mouth daily.  guaiFENesin (ORGANIDIN) 400 mg tablet Take  by mouth two (2) times a day. No current facility-administered medications for this visit. SYSTEM REVIEW NOT RELATED TO LIVER DISEASE OR REVIEWED ABOVE:  Constitution systems: Negative for fever, chills, weight gain. Weight loss of 11# since 2018. Eyes: Negative for visual changes. ENT: Negative for sore throat, painful swallowing. Respiratory: Negative for cough, hemoptysis, SOB. Cardiology: Negative for chest pain, palpitations. GI:  Negative for constipation or diarrhea. : Negative for urinary frequency, dysuria, hematuria, nocturia. Skin: Negative for rash. Hematology: Negative for easy bruising, blood clots. Musculo-skeletal: Negative for back pain, muscle pain, weakness. Neurologic: Negative for headaches, dizziness, vertigo, memory problems not related to HE. Psychology: Negative for anxiety, depression. FAMILY HISTORY:  The father  of dementia. The mother has the following chronic diseases: dementia. There is no family history of liver disease. SOCIAL HISTORY:  The patient is . The patient has 2 children, and 3 grandchildren. The patient stopped using tobacco products in 10/1998.     The patient has previously consumed alcohol socially never in excess. The patient has been abstinent from alcohol since 6/2015. The patient used to work as a muscician. PHYSICAL EXAMINATION:  Visit Vitals    /60 (BP 1 Location: Left arm, BP Patient Position: Sitting)    Pulse 65    Temp 96.7 °F (35.9 °C) (Tympanic)    Wt 227 lb (103 kg)    SpO2 95%    BMI 33.52 kg/m2     General: No acute distress. Eyes: Sclera anicteric. ENT: No oral lesions. Thyroid normal.  Nodes: No adenopathy. Skin: No spider angiomata. No jaundice. No palmar erythema. Respiratory: Lungs clear to auscultation. Cardiovascular: Regular heart rate. No murmurs. No JVD. Abdomen: Soft non-tender. Liver size normal to percussion/palpation. Spleen not palpable. No obvious ascites. Extremities: No edema. No muscle wasting. No gross arthritic changes. Neurologic: Alert and oriented. Cranial nerves grossly intact. No asterixis. LABORATORY STUDIES:  Liver [place] of [address] Units 2/16/2018   WBC 3.4 - 10.8 x10E3/uL 13.0 (H)   ANC 1.4 - 7.0 x10E3/uL 7.5 (H)   HGB 13.0 - 17.7 g/dL 15.3    - 379 x10E3/uL 166   INR 0.8 - 1.2 1.0   AST 0 - 40 IU/L 49 (H)   ALT 0 - 44 IU/L 53 (H)   Alk Phos 39 - 117 IU/L 106   Bili, Total 0.0 - 1.2 mg/dL 0.2   Bili, Direct 0.00 - 0.40 mg/dL 0.11   Albumin 3.6 - 4.8 g/dL 4.0   BUN 8 - 27 mg/dL 18   Creat 0.76 - 1.27 mg/dL 0.78   Na 134 - 144 mmol/L 139   K 3.5 - 5.2 mmol/L 4.1   Cl 96 - 106 mmol/L 100   CO2 18 - 29 mmol/L 22   Glucose 65 - 99 mg/dL 144 (H)   Additional lab values drawn at today's office visit are pending at the time of documentation.     SEROLOGIES:  Serologies Latest Ref Rng & Units 2/16/2018   Hep A Ab, Total Negative Negative   Hep B Surface Ag Negative Negative   Hep B Core Ab, Total Negative Negative   Hep B Surface AB QL  Non Reactive   Hep C Ab 0.0 - 0.9 s/co ratio <0.1   Ferritin 30 - 400 ng/mL 31   Iron % Saturation 15 - 55 % 22   LAUREN, IFA  Negative   ASMCA 0 - 19 Units 11   Alpha-1 antitrypsin level 90 - 200 mg/dL 170     LIVER HISTOLOGY:  3/2018. FibroScan performed at [address]. EkPa was 31.2. IQR/med 56%. . Consistent with ALFONSO and cirrhosis. ENDOSCOPIC PROCEDURES:  1/2013. Colonoscopy performed by Dr Lazaro Burch. No polyps. 1/2013. EGD performed by Dr Lazaro Burch. No varices, gastric erythema. 4/2018. Colonoscopy performed by Dr Lazaro Burch. Reportedly normal per patient. RADIOLOGY:  12/2017. Ultrasound of liver. Normal appearing liver. No liver mass lesions. OTHER TESTING:  Not available or performed    ASSESSMENT AND PLAN:  Suspected ALFONSO with cirrhosis. Liver transaminases are elevated. Liver function is normal.  The platelet count is normal.      I have reviewed in detail with the patient the natural history and recommended follow-up of ALFONSO cirrhosis. This will include surveillance for [address] and varices as well as monitoring for fluid overload and liver function. [address] screening. Will obtain baseline AFP in the office today and I have ordered US of the liver, this will be repeated every 6 months. Will schedule for EGD to evaluate for esophageal varices and need to administer treatment to reduce risk of first variceal bleed. The patient was counseled regarding diet and exercise to achieve weight loss. He is carefully following calories and activity levels, with a goal of 200#. The only medical treatments for ALFONSO are though clinical trials. The patient was offered participation in a clinical trial for treatment of ALFONSO. The patient would like to defer participation in a clinical trial at this time. The patient was directed to continue all current medications at the current dosages. There are no contraindications for the patient to take any medications that are necessary for treatment of other medical issues including medications for diabetes mellitus and hypercholesterolemia.     The patient was counseled regarding alcohol consumption and that this could contribute to fatty liver disease. Vaccination for viral hepatitis A and B is recommended since the patient has no serologic evidence of previous exposure or vaccination with immunity. All of the above issues were discussed with the patient. All questions were answered. The patient expressed a clear understanding of the above. [address] [place] [address] in 3-4 months with EGD and US in the meantime.     Aundrea Jacobs PA-C  Liver [place] of [address] [place] [address], [address] [address]  22.  [phone]

## 2018-06-16 LAB
ALBUMIN SERPL-MCNC: 4.2 G/DL (ref 3.6–4.8)
ALP SERPL-CCNC: 123 IU/L (ref 39–117)
ALT SERPL-CCNC: 37 IU/L (ref 0–44)
AST SERPL-CCNC: 37 IU/L (ref 0–40)
BILIRUB DIRECT SERPL-MCNC: 0.11 MG/DL (ref 0–0.4)
BILIRUB SERPL-MCNC: 0.3 MG/DL (ref 0–1.2)
BUN SERPL-MCNC: 17 MG/DL (ref 8–27)
BUN/CREAT SERPL: 17 (ref 10–24)
CALCIUM SERPL-MCNC: 9.4 MG/DL (ref 8.6–10.2)
CHLORIDE SERPL-SCNC: 99 MMOL/L (ref 96–106)
CO2 SERPL-SCNC: 23 MMOL/L (ref 20–29)
CREAT SERPL-MCNC: 1.02 MG/DL (ref 0.76–1.27)
ERYTHROCYTE [DISTWIDTH] IN BLOOD BY AUTOMATED COUNT: 14.6 % (ref 12.3–15.4)
GFR SERPLBLD CREATININE-BSD FMLA CKD-EPI: 77 ML/MIN/1.73
GFR SERPLBLD CREATININE-BSD FMLA CKD-EPI: 89 ML/MIN/1.73
GLUCOSE SERPL-MCNC: 72 MG/DL (ref 65–99)
HCT VFR BLD AUTO: 45 % (ref 37.5–51)
HGB BLD-MCNC: 15.1 G/DL (ref 13–17.7)
INR PPP: 1 (ref 0.8–1.2)
MCH RBC QN AUTO: 31.5 PG (ref 26.6–33)
MCHC RBC AUTO-ENTMCNC: 33.6 G/DL (ref 31.5–35.7)
MCV RBC AUTO: 94 FL (ref 79–97)
PLATELET # BLD AUTO: 181 X10E3/UL (ref 150–379)
POTASSIUM SERPL-SCNC: 4.4 MMOL/L (ref 3.5–5.2)
PROTHROMBIN TIME: 10.9 SEC (ref 9.1–12)
RBC # BLD AUTO: 4.8 X10E6/UL (ref 4.14–5.8)
SODIUM SERPL-SCNC: 137 MMOL/L (ref 134–144)
WBC # BLD AUTO: 10 X10E3/UL (ref 3.4–10.8)

## 2018-06-19 LAB
AFP L3 MFR SERPL: NORMAL % (ref 0–9.9)
AFP SERPL-MCNC: 2.3 NG/ML (ref 0–8)

## 2018-06-21 NOTE — PROGRESS NOTES
Patient notified of stable lab findings, follow-up as scheduled. Proceed with US and EGD as planned.

## 2018-07-02 ENCOUNTER — HOSPITAL ENCOUNTER (OUTPATIENT)
Dept: ULTRASOUND IMAGING | Age: 63
Discharge: HOME | End: 2018-07-02
Attending: PHYSICIAN ASSISTANT
Payer: COMMERCIAL

## 2018-07-02 DIAGNOSIS — K74.60 CIRRHOSIS OF LIVER WITHOUT ASCITES, UNSPECIFIED HEPATIC CIRRHOSIS TYPE (HCC): ICD-10-CM

## 2018-07-02 PROCEDURE — 76700 US EXAM ABDOM COMPLETE: CPT

## 2018-12-06 ENCOUNTER — HOSPITAL ENCOUNTER (OUTPATIENT)
Age: 63
Setting detail: OUTPATIENT SURGERY
Discharge: HOME | End: 2018-12-06
Attending: INTERNAL MEDICINE | Admitting: INTERNAL MEDICINE
Payer: COMMERCIAL

## 2018-12-06 ENCOUNTER — ANESTHESIA (OUTPATIENT)
Dept: ENDOSCOPY | Age: 63
End: 2018-12-06
Payer: COMMERCIAL

## 2018-12-06 ENCOUNTER — ANESTHESIA EVENT (OUTPATIENT)
Dept: ENDOSCOPY | Age: 63
End: 2018-12-06
Payer: COMMERCIAL

## 2018-12-06 VITALS
WEIGHT: 220 LBS | HEIGHT: 70 IN | DIASTOLIC BLOOD PRESSURE: 77 MMHG | RESPIRATION RATE: 22 BRPM | OXYGEN SATURATION: 98 % | HEART RATE: 78 BPM | BODY MASS INDEX: 31.5 KG/M2 | TEMPERATURE: 98.2 F | SYSTOLIC BLOOD PRESSURE: 138 MMHG

## 2018-12-06 LAB
GLUCOSE BLD STRIP.AUTO-MCNC: 119 MG/DL (ref 65–100)
SERVICE CMNT-IMP: ABNORMAL

## 2018-12-06 PROCEDURE — 82962 GLUCOSE BLOOD TEST: CPT

## 2018-12-06 PROCEDURE — 74011250636 HC RX REV CODE- 250/636

## 2018-12-06 PROCEDURE — 76060000032 HC ANESTHESIA 0.5 TO 1 HR: Performed by: INTERNAL MEDICINE

## 2018-12-06 PROCEDURE — 76040000007: Performed by: INTERNAL MEDICINE

## 2018-12-06 PROCEDURE — 77030009426 HC FCPS BIOP ENDOSC BSC -B: Performed by: INTERNAL MEDICINE

## 2018-12-06 PROCEDURE — 88305 TISSUE EXAM BY PATHOLOGIST: CPT

## 2018-12-06 RX ORDER — SODIUM CHLORIDE, SODIUM LACTATE, POTASSIUM CHLORIDE, CALCIUM CHLORIDE 600; 310; 30; 20 MG/100ML; MG/100ML; MG/100ML; MG/100ML
INJECTION, SOLUTION INTRAVENOUS
Status: DISCONTINUED | OUTPATIENT
Start: 2018-12-06 | End: 2018-12-06 | Stop reason: HOSPADM

## 2018-12-06 RX ORDER — FENTANYL CITRATE 50 UG/ML
50-200 INJECTION, SOLUTION INTRAMUSCULAR; INTRAVENOUS
Status: ACTIVE | OUTPATIENT
Start: 2018-12-06 | End: 2018-12-06

## 2018-12-06 RX ORDER — PROPOFOL 10 MG/ML
INJECTION, EMULSION INTRAVENOUS AS NEEDED
Status: DISCONTINUED | OUTPATIENT
Start: 2018-12-06 | End: 2018-12-06 | Stop reason: HOSPADM

## 2018-12-06 RX ORDER — DEXTROMETHORPHAN/PSEUDOEPHED 2.5-7.5/.8
1.2 DROPS ORAL
Status: DISCONTINUED | OUTPATIENT
Start: 2018-12-06 | End: 2018-12-08 | Stop reason: HOSPADM

## 2018-12-06 RX ORDER — SODIUM CHLORIDE 0.9 % (FLUSH) 0.9 %
5-10 SYRINGE (ML) INJECTION EVERY 8 HOURS
Status: ACTIVE | OUTPATIENT
Start: 2018-12-06 | End: 2018-12-06

## 2018-12-06 RX ORDER — MIDAZOLAM HYDROCHLORIDE 1 MG/ML
5-10 INJECTION, SOLUTION INTRAMUSCULAR; INTRAVENOUS
Status: ACTIVE | OUTPATIENT
Start: 2018-12-06 | End: 2018-12-06

## 2018-12-06 RX ORDER — SODIUM CHLORIDE 9 MG/ML
50 INJECTION, SOLUTION INTRAVENOUS CONTINUOUS
Status: DISPENSED | OUTPATIENT
Start: 2018-12-06 | End: 2018-12-06

## 2018-12-06 RX ORDER — NALOXONE HYDROCHLORIDE 0.4 MG/ML
0.4 INJECTION, SOLUTION INTRAMUSCULAR; INTRAVENOUS; SUBCUTANEOUS
Status: ACTIVE | OUTPATIENT
Start: 2018-12-06 | End: 2018-12-06

## 2018-12-06 RX ORDER — FLUMAZENIL 0.1 MG/ML
0.2 INJECTION INTRAVENOUS
Status: ACTIVE | OUTPATIENT
Start: 2018-12-06 | End: 2018-12-06

## 2018-12-06 RX ORDER — ATROPINE SULFATE 0.1 MG/ML
0.5 INJECTION INTRAVENOUS
Status: ACTIVE | OUTPATIENT
Start: 2018-12-06 | End: 2018-12-07

## 2018-12-06 RX ORDER — EPINEPHRINE 0.1 MG/ML
1 INJECTION INTRACARDIAC; INTRAVENOUS
Status: ACTIVE | OUTPATIENT
Start: 2018-12-06 | End: 2018-12-07

## 2018-12-06 RX ORDER — SODIUM CHLORIDE 0.9 % (FLUSH) 0.9 %
5-10 SYRINGE (ML) INJECTION AS NEEDED
Status: ACTIVE | OUTPATIENT
Start: 2018-12-06 | End: 2018-12-06

## 2018-12-06 RX ADMIN — PROPOFOL 50 MG: 10 INJECTION, EMULSION INTRAVENOUS at 07:44

## 2018-12-06 RX ADMIN — SODIUM CHLORIDE, SODIUM LACTATE, POTASSIUM CHLORIDE, CALCIUM CHLORIDE: 600; 310; 30; 20 INJECTION, SOLUTION INTRAVENOUS at 07:42

## 2018-12-06 NOTE — ROUTINE PROCESS

## 2018-12-06 NOTE — DISCHARGE INSTRUCTIONS
3340 [facility], MD, [address], [place], [place]       Fidel Baron, NP Claudene Lobo, PA-C Burnett Peach, [facility]   Wiley Hamman, NP Ignacia Fortes, NP [address] [facility] [address] 136    at [address], [address] Shabana Loomis  22.    [phone]    FAX: 126 [address], [address]    [phone]    FAX: [phone]         ENDOSCOPY DISCHARGE INSTRUCTIONS      Ariella Riggs  1955  Date: 12/6/2018    DISCOMFORT:  Use lozenges or warm salt water gargle for sore thoat  Apply warm compress to IV site if red. If redness or soreness persists call the office. You may experience gas and bloating. Walking and belching will help relieve this. You may experience chest discomfort or pressure especially if banding of esophageal varices was performed. DIET:  You may resume your normal diet. ACTIVITY:  Spend the remainder of the day resting. Avoid any strenuous activity. You may not operate a vehicle for 12 hours. You may not engage in an occupation involving machinery or appliances for rest of today. Avoid making any critical or financial decisions for at least 24 hour. Call the [address] [employer] if you have any of the following:  Increasing chest or abdominal pain, nausea, vomiting, vomiting blood, abdominal distension or swelling, fever or chills, bloody discharge from nose or mouth or shortness of breath. Follow-up Instructions:  Call Dr. Neal Bailon for any questions or problems at the phone number listed above. If a biopsy was performed, you will be contacted by the office staff or Dr Neal Bailon within 1 week.    If you have not heard from us by then you may call the office at the phone number listed above to inquire about the results. ENDOSCOPY FINDINGS:  Your endoscopy demonstrated small dilated veins in the food tube. No banding performed. Mild swelling and irritation in the stomach  A biopsy of the stomach was taken. DISCHARGE SUMMARY from the Nurse:   The following personal items collected during your admission are returned to you:   Dental Appliance: Dental Appliances: None  Vision: Visual Aid: None  Hearing Aid:    Jewelry:    Clothing:    Other Valuables:    Valuables sent to safe:

## 2018-12-06 NOTE — ANESTHESIA PREPROCEDURE EVALUATION
Anesthetic History               Review of Systems / Medical History  Patient summary reviewed, nursing notes reviewed and pertinent labs reviewed    Pulmonary    COPD    Sleep apnea           Neuro/Psych              Cardiovascular    Hypertension          CAD         GI/Hepatic/Renal           Liver disease     Endo/Other    Diabetes    Arthritis     Other Findings              Physical Exam    Airway  Mallampati: I  TM Distance: > 6 cm  Neck ROM: normal range of motion   Mouth opening: Normal     Cardiovascular    Rhythm: regular  Rate: normal         Dental  No notable dental hx       Pulmonary  Breath sounds clear to auscultation               Abdominal         Other Findings            Anesthetic Plan    ASA: 3  Anesthesia type: MAC          Induction: Intravenous  Anesthetic plan and risks discussed with: Patient

## 2018-12-06 NOTE — PROCEDURES
3340 \[facility]", MD, [address], [place], [place]       LIYA Pearson PA-C Celia Ora, ACNP-LIYA Balderrama NP [facility] 136    at [address]    [address] [place] [address], [address] [place] [address], [place]  22.    [phone]    FAX: [address] [place] [address], [address]    [phone]    FAX: [phone]         UPPER ENDOSCOPY PROCEDURE NOTE    [facility]  1955    INDICATION: Cirrhosis. Screening for esophageal varices with variceal ligation if  indicated. : Aakash Cifuentes MD    ANESTHESIA/SEDATION: Propofol was administered by anesthesia    PROCEDURE DESCRIPTION:  [employer] consent was obtained from the patient for the procedure. All risks and benefits of the procedure explained. The patient was taken to the endoscopy suite and placed in the left lateral decubitus position. Following sequential administration of sedation to doses as indicated above the endoscope was inserted into the mouth and advanced under direct vision to the second portion of the duodenum. Careful inspection of upper gastrointestinal tract was made as the endoscope was inserted and withdrawn. Retroflexion of the endoscope to view of the cardia of the stomach was performed. The findings and interventions are described below. FINDINGS:  Esophagus:    Small esophageal varices. No banding performed. Stomach:   Mild portal hypertensive gastropathy of the body of the stomach  Gastritis of the antrum  No gastric varices identified. Duodenum:   Normal bulb and second portion    INTERVENTION:   2 biopsies were taken from the antrum. Specimens were transported to Pathology    COMPLICATIONS: None.   The patient tolerated the procedure well. EBL: Negligible. RECOMMENDATIONS:  Observe until discharge parameters are achieved. May resume previous diet. Repeat endoscopy to reassess varices and need for banding in 6 months. Follow-up Liver [facility] office as scheduled.       MD Monica Mayer [address] [place] [address], [address] [place] [address] 22.  [phone]  [address] [place] [address]

## 2018-12-06 NOTE — H&P
3340 [facility], MD, [address], [facility], [place]       LIYA Gaston PA-C Fanny Archer, ACNP-BC   Sander Jenkins, LIYA [facility] [address]    at [address], [address] [place] [address], Shabana  22.    [phone]    FAX: [address] [place] [address], [address]    [phone]    FAX: [phone]         PRE-PROCEDURE NOTE - EGD    H and P from last office visit reviewed. Allergies reviewed. Out-patient medicaton list reviewed. Patient Active Problem List   Diagnosis Code    Diastolic CHF, acute on chronic (HCC) I50.33    CAD (coronary artery disease) I25.10    Chronic airway obstruction, not elsewhere classified J44.9    HTN (hypertension), benign I10    Anemia, unspecified D64.9    Obstructive sleep apnea (adult) (pediatric) G47.33    Chronic blood loss anemia D50.0    NAFLD (nonalcoholic fatty liver disease) K76.0    Type II diabetes mellitus (HCC) E11.9    Cirrhosis of liver without ascites (HCC) K74.60       Allergies   Allergen Reactions    Ciprofloxacin Unknown (comments)    Ezetimibe Unknown (comments)    Penicillins Rash    Pravastatin Unknown (comments)    Simvastatin Unknown (comments)       No current facility-administered medications on file prior to encounter. Current Outpatient Medications on File Prior to Encounter   Medication Sig Dispense Refill    metFORMIN ER (GLUCOPHAGE XR) 500 mg tablet TAKE ONE TABLET BY MOUTH EVERY DAY WITH evening meal  1    lansoprazole (PREVACID PO) Take  by mouth.  fluticasone furoate (ARNUITY ELLIPTA) 100 mcg/actuation dsdv inhaler Take 1 Puff by inhalation daily.       insulin lispro protamine/insulin lispro (HUMALOG MIX 50-50 INSULN U-100) 100 unit/mL (50-50) injection by SubCUTAneous route.  UMECLIDINIUM BRM/VILANTEROL TR (ANORO ELLIPTA IN) Take  by inhalation.  INSULIN GLARGINE,HUM. REC. ANLOG (TOUJEO SOLOSTAR U-300 INSULIN SC) by SubCUTAneous route.  losartan (COZAAR) 50 mg tablet Take 50 mg by mouth daily.  metoprolol-XL (TOPROL-XL) 50 mg XL tablet Take 50 mg by mouth daily.  montelukast (SINGULAIR) 10 mg tablet Take 10 mg by mouth daily.  albuterol (VENTOLIN HFA) 90 mcg/actuation inhaler Take 2 Puffs by inhalation every four (4) hours as needed.  ergocalciferol (VITAMIN D2) 50,000 unit capsule Take 50,000 Units by mouth.  exenatide microspheres (BYDUREON BCISE) 2 mg/0.85 mL atIn by SubCUTAneous route.  aspirin delayed-release 81 mg tablet Take  by mouth daily.  diltiazem (TIAZAC) 180 mg SR capsule Take 180 mg by mouth daily.  guaiFENesin (ORGANIDIN) 400 mg tablet Take  by mouth two (2) times a day.  atorvastatin (LIPITOR) 10 mg tablet Take 5 mg by mouth daily.  nitroglycerin (NITROSTAT) 0.4 mg SL tablet by SubLINGual route every five (5) minutes as needed. For EGD to assess for esophageal and gastric varices. Plan to perform banding if indicated based upon variceal size and appearance. The risks of the procedure were discussed with the patient. These included reaction to anesthesia, pain, perforation and bleeding. All questions were answered. The patient wishes to proceed with the procedure. PHYSICAL EXAMINATION:  VS: per nursing note  General: No acute distress. Eyes: Sclera anicteric. ENT: No oral lesions. Thyroid normal.  Nodes: No adenopathy. Skin: No spider angiomata. No jaundice. No palmar erythema. Respiratory: Lungs clear to auscultation. Cardiovascular: Regular heart rate. No murmurs. No JVD. Abdomen: Soft non-tender, liver size normal to percussion/palpation. Spleen not palpable. No obvious ascites.   Extremities: No edema. No muscle wasting. No gross arthritic changes. Neurologic: Alert and oriented. Cranial nerves grossly intact. No asterixis. MOST RECENT LABORATORY STUDIES:  Lab Results   Component Value Date/Time    WBC 10.0 06/15/2018 08:59 AM    Hemoglobin (POC) 13.3 12/05/2012 12:23 PM    HGB 15.1 06/15/2018 08:59 AM    Hematocrit (POC) 39 12/05/2012 12:23 PM    HCT 45.0 06/15/2018 08:59 AM    PLATELET 102 69/90/9128 08:59 AM    MCV 94 06/15/2018 08:59 AM     Lab Results   Component Value Date/Time    INR 1.0 06/15/2018 08:59 AM    INR 1.0 02/16/2018 12:00 AM    INR 1.0 01/11/2013 06:50 PM    Prothrombin time 10.9 06/15/2018 08:59 AM    Prothrombin time 10.8 02/16/2018 12:00 AM    Prothrombin time 10.0 01/11/2013 06:50 PM       ASSESSMENT AND PLAN:  EGD to assess for esophageal and/or gastric varices. Conscious sedation with fentanyl and versed.     Luanne Davison MD  [facility] [address] of [address], [address] [facility] 22.  [phone]  [address] [place] [address]

## 2018-12-06 NOTE — PROGRESS NOTES
Xiao Ferreirajefwilian  1955  [id number]    Situation:  Verbal report received from:Yvrose  Procedure: Procedure(s):  ESOPHAGOGASTRODUODENOSCOPY (EGD)  ESOPHAGOGASTRODUODENAL (EGD) BIOPSY    Background:    Preoperative diagnosis: NON-ALCOHOLIC FATTY LIVER [id number]  Postoperative diagnosis: 1. Portal Gastropathy  2. Gastritis  3. Esophageal Varices    :  Dr. Shanon Deleon  Assistant(s): Endoscopy Technician-1: Dominick Ascencio  Endoscopy RN-1: Axel Martin RN    Specimens:   ID Type Source Tests Collected by Time Destination   1 : Antrum Biopsy Preservative Stomach, Antrum  Mike Puckett MD 12/6/2018 5977 Pathology     H. Pylori  no    Assessment:  Intra-procedure medications   VAnesthesia gave intra-procedure sedation and medications, see anesthesia flow sheet yes    Intravenous fluids: NS@ KVO     Vital signs stable    Abdominal assessment: round and soft     Recommendation:  Discharge patient per MD order.     Family or Friend   Permission to share finding with family or friend yes

## 2018-12-06 NOTE — ANESTHESIA POSTPROCEDURE EVALUATION
Post-Anesthesia Evaluation and Assessment    Patient: Flaco Peguero MRN: [id number]  SSN: [id number]    YOB: 1955  Age: 58 y.o. Sex: male      I have evaluated the patient and they are stable and ready for discharge from the PACU. Cardiovascular Function/Vital Signs  Visit Vitals  /70   Pulse 79   Temp 36.8 °C (98.2 °F)   Resp 19   Ht 5' 10\" (1.778 m)   Wt 99.8 kg (220 lb)   SpO2 96%   BMI 31.57 kg/m²       Patient is status post MAC anesthesia for Procedure(s):  ESOPHAGOGASTRODUODENOSCOPY (EGD)  ESOPHAGOGASTRODUODENAL (EGD) BIOPSY. Nausea/Vomiting: None    Postoperative hydration reviewed and adequate. Pain:  Pain Scale 1: Numeric (0 - 10) (12/06/18 0813)  Pain Intensity 1: 0 (12/06/18 0813)   Managed    Neurological Status: At baseline    Mental Status, Level of Consciousness: Alert and  oriented to person, place, and time    Pulmonary Status:   O2 Device: Room air (12/06/18 0816)   Adequate oxygenation and airway patent    Complications related to anesthesia: None    Post-anesthesia assessment completed. No concerns    Signed By: Veena Mason MD     December 6, 2018              Procedure(s):  ESOPHAGOGASTRODUODENOSCOPY (EGD)  ESOPHAGOGASTRODUODENAL (EGD) BIOPSY.     <BSHSIANPOST>    Visit Vitals  /75   Pulse 84   Temp 36.8 °C (98.2 °F)   Resp 25   Ht 5' 10\" (1.778 m)   Wt 99.8 kg (220 lb)   SpO2 96%   BMI 31.57 kg/m²

## 2018-12-12 ENCOUNTER — OFFICE VISIT (OUTPATIENT)
Dept: HEMATOLOGY | Age: 63
End: 2018-12-12

## 2018-12-12 VITALS
OXYGEN SATURATION: 96 % | DIASTOLIC BLOOD PRESSURE: 59 MMHG | WEIGHT: 227.6 LBS | HEART RATE: 79 BPM | BODY MASS INDEX: 32.58 KG/M2 | HEIGHT: 70 IN | SYSTOLIC BLOOD PRESSURE: 134 MMHG | TEMPERATURE: 99.5 F

## 2018-12-12 DIAGNOSIS — K74.60 CIRRHOSIS OF LIVER WITHOUT ASCITES, UNSPECIFIED HEPATIC CIRRHOSIS TYPE (HCC): Primary | ICD-10-CM

## 2018-12-12 NOTE — PROGRESS NOTES
1. Have you been to the ER, urgent care clinic since your last visit? Hospitalized since your last visit? No    2. Have you seen or consulted any other health care providers outside of the [address] [place] [address] since your last visit? Include any pap smears or colon screening. No     Chief Complaint   Patient presents with    Follow-up     Visit Vitals  /59 (BP 1 Location: Left arm, BP Patient Position: Sitting)   Pulse 79   Temp 99.5 °F (37.5 °C) (Tympanic)   Ht 5' 10\" (1.778 m)   Wt 227 lb 9.6 oz (103.2 kg)   SpO2 96%   BMI 32.66 kg/m²     PHQ over the last two weeks 12/12/2018   Little interest or pleasure in doing things Not at all   Feeling down, depressed, irritable, or hopeless Not at all   Total Score PHQ 2 0     Learning Assessment 12/12/2018   PRIMARY LEARNER Patient   BARRIERS PRIMARY LEARNER NONE   CO-LEARNER CAREGIVER No   PRIMARY LANGUAGE ENGLISH   LEARNER PREFERENCE PRIMARY LISTENING   ANSWERED BY patient   RELATIONSHIP SELF     Abuse Screening Questionnaire 12/12/2018   Do you ever feel afraid of your partner? N   Are you in a relationship with someone who physically or mentally threatens you? N   Is it safe for you to go home?  Ruel Espino

## 2018-12-12 NOTE — PROGRESS NOTES
[address] [facility], MD, FACP, [facility], [place]       April LIYA Manuel, NANCY Mckeon, ACNP-BC   Ravinder Inman, LIYA Canales On license of [facility] 136    at [address], [address] [place] [address], [place] [address].    [phone]    FAX: [address] [place] [address]    at [address], [address] [place] [address],[address]    [address] [place] [address] Street: [phone]         Patient Care Team:  Marisel Robertson MD as PCP - General (Geriatric Medicine)      Problem List  Date Reviewed: 12/12/2018          Codes Class Noted    Cirrhosis of liver without ascites [facility]) ICD-10-CM: K74.60  ICD-9-CM: 571.5  6/15/2018        Type II diabetes mellitus ([facility] ICD-10-CM: E11.9  ICD-9-CM: 250.00  5/17/2018        NAFLD (nonalcoholic fatty liver disease) ICD-10-CM: K76.0  ICD-9-CM: 571.8  2/16/2018        Chronic blood loss anemia ICD-10-CM: D50.0  ICD-9-CM: 280.0  1/12/2013        Chronic airway obstruction, not elsewhere classified ICD-10-CM: J44.9  ICD-9-CM: 410  1/11/2013        HTN (hypertension), benign ICD-10-CM: I10  ICD-9-CM: 401.1  1/11/2013        Anemia, unspecified ICD-10-CM: D64.9  ICD-9-CM: 285.9  1/11/2013        Obstructive sleep apnea (adult) (pediatric) ICD-10-CM: G47.33  ICD-9-CM: 327.23  2/80/0473        Diastolic CHF, acute on chronic [facility]) ICD-10-CM: I50.33  ICD-9-CM: 428.33, 428.0  8/23/2012        CAD (coronary artery disease) ICD-10-CM: I25.10  ICD-9-CM: 414.00  8/23/2012              Bailey Mitchell [facility] for management of non-alcoholic fatty liver disease (NAFLD).   The active problem list, all pertinent past medical history, medications, radiologic findings and laboratory findings related to the liver disorder were reviewed with the patient. The patient is a 58 y.o.  male who was found to have elevated liver enzymes in 2017 and is suspected to have fatty liver disease. Serologic evaluation for causes of chronic liver disease were negative. Ultrasound of the liver was last performed in 7/2018. The results of the imaging suggested cirrhotic liver morphology without focal liver mass. Assessment of liver fibrosis was performed in the past with Fibroscan. The result was 31.2 kPa which correlates with cirrhosis. He has since had liver biopsy without complication which confirmed 10-25% steatosis and cirrhosis. The most recent laboratory studies indicate that the liver transaminases are elevated, ALP is normal, tests of hepatic synthetic and metabolic function are normal, and the platelet count is normal.    The patient has no symptoms which could be attributed to the liver disorder. The patient completes all daily activities without any functional limitations. The patient has not experienced fatigue, pain in the right side over the liver. He has completed recent screening testing for complications of cirrhosis and has had no new issues or concerns. His only comment recently is of significant increased stresses lately. He knows that he has not been taking as close care of his DM management and weight and has been having variable weight issues. He has also noted some increased acid symptoms and feeling like his stomach is \"rumbling\".      ALLERGIES  Allergies   Allergen Reactions    Ciprofloxacin Unknown (comments)    Ezetimibe Unknown (comments)    Penicillins Rash    Pravastatin Unknown (comments)    Simvastatin Unknown (comments)       MEDICATIONS  Current Outpatient Medications   Medication Sig    metFORMIN ER (GLUCOPHAGE XR) 500 mg tablet TAKE ONE TABLET BY MOUTH EVERY DAY WITH evening meal    ergocalciferol (VITAMIN D2) 50,000 unit capsule Take 50,000 Units by mouth every seven (7) days.  lansoprazole (PREVACID PO) Take  by mouth.  fluticasone furoate (ARNUITY ELLIPTA) 100 mcg/actuation dsdv inhaler Take 1 Puff by inhalation daily.  exenatide microspheres (BYDUREON BCISE) 2 mg/0.85 mL atIn by SubCUTAneous route.  insulin lispro protamine/insulin lispro (HUMALOG MIX 50-50 INSULN U-100) 100 unit/mL (50-50) injection by SubCUTAneous route.  UMECLIDINIUM BRM/VILANTEROL TR (ANORO ELLIPTA IN) Take  by inhalation.  INSULIN GLARGINE,HUM. REC. ANLOG (TOUJEO SOLOSTAR U-300 INSULIN SC) by SubCUTAneous route.  aspirin delayed-release 81 mg tablet Take  by mouth daily.  diltiazem (TIAZAC) 180 mg SR capsule Take 180 mg by mouth daily.  atorvastatin (LIPITOR) 10 mg tablet Take 5 mg by mouth daily.  losartan (COZAAR) 50 mg tablet Take 50 mg by mouth daily.  metoprolol-XL (TOPROL-XL) 50 mg XL tablet Take 50 mg by mouth daily.  nitroglycerin (NITROSTAT) 0.4 mg SL tablet by SubLINGual route every five (5) minutes as needed.  montelukast (SINGULAIR) 10 mg tablet Take 10 mg by mouth daily.  albuterol (VENTOLIN HFA) 90 mcg/actuation inhaler Take 2 Puffs by inhalation every four (4) hours as needed.  guaiFENesin (ORGANIDIN) 400 mg tablet Take  by mouth two (2) times a day. No current facility-administered medications for this visit. SYSTEM REVIEW NOT RELATED TO LIVER DISEASE OR REVIEWED ABOVE:  Constitution systems: Negative for fever, chills, weight gain. Weight stable over the course of the past 6 months. Eyes: Negative for visual changes. ENT: Negative for sore throat, painful swallowing. Respiratory: Negative for cough, hemoptysis, SOB. Cardiology: Negative for chest pain, palpitations. GI:  Negative for constipation or diarrhea. : Negative for urinary frequency, dysuria, hematuria, nocturia. Skin: Negative for rash. Hematology: Negative for easy bruising, blood clots.     Musculo-skeletal: Negative for back pain, muscle pain, weakness. Neurologic: Negative for headaches, dizziness, vertigo, memory problems not related to HE. Psychology: Negative for anxiety, depression. FAMILY HISTORY:  The father  of dementia. The mother has the following chronic diseases: dementia. There is no family history of liver disease. SOCIAL HISTORY:  The patient is . The patient has 2 children, and 3 grandchildren. The patient stopped using tobacco products in 10/1998. The patient has previously consumed alcohol socially never in excess. The patient has been abstinent from alcohol since 2015. The patient used to work as a muscician. PHYSICAL EXAMINATION:  Visit Vitals  /59 (BP 1 Location: Left arm, BP Patient Position: Sitting)   Pulse 79   Temp 99.5 °F (37.5 °C) (Tympanic)   Ht 5' 10\" (1.778 m)   Wt 227 lb 9.6 oz (103.2 kg)   SpO2 96%   BMI 32.66 kg/m²     General: No acute distress. Eyes: Sclera anicteric. ENT: No oral lesions. Thyroid normal.  Nodes: No adenopathy. Skin: No spider angiomata. No jaundice. Positive for palmar erythema. Respiratory: Lungs clear to auscultation. Cardiovascular: Regular heart rate. No murmurs. No JVD. Abdomen: Soft non-tender. Liver edge firm as easily palpable 2-3 cm BCM. Spleen not palpable. No obvious ascites. Extremities: No edema. No muscle wasting. No gross arthritic changes. Neurologic: Alert and oriented. Cranial nerves grossly intact. No asterixis.     LABORATORY STUDIES:  From 2018  AST/ALT/ALP/T Bili/ALB:40/50/117/0.4/4.2  WBC/HB/PLT/INR:9.4/15/135  BUN/CREAT:14/0.78    Liver [place] of [address] Units 6/15/2018 2018   WBC 3.4 - 10.8 x10E3/uL 10.0 13.0 (H)   ANC 1.4 - 7.0 x10E3/uL  7.5 (H)   HGB 13.0 - 17.7 g/dL 15.1 15.3    - 379 x10E3/uL 181 166   INR 0.8 - 1.2 1.0 1.0   AST 0 - 40 IU/L 37 49 (H)   ALT 0 - 44 IU/L 37 53 (H)   Alk Phos 39 - 117 IU/L 123 (H) 106   Bili, Total 0.0 - 1.2 mg/dL 0.3 0.2   Bili, Direct 0.00 - 0.40 mg/dL 0.11 0.11   Albumin 3.6 - 4.8 g/dL 4.2 4.0   BUN 8 - 27 mg/dL 17 18   Creat 0.76 - 1.27 mg/dL 1.02 0.78   Na 134 - 144 mmol/L 137 139   K 3.5 - 5.2 mmol/L 4.4 4.1   Cl 96 - 106 mmol/L 99 100   CO2 20 - 29 mmol/L 23 22   Glucose 65 - 99 mg/dL 72 144 (H)     Cancer Screening Latest Ref Rng & Units 6/15/2018   AFP, Serum 0.0 - 8.0 ng/mL 2.3   AFP-L3% 0.0 - 9.9 % Comment   Additional lab values drawn at today's office visit are pending at the time of documentation. SEROLOGIES:  Serologies Latest Ref Rng & Units 2/16/2018   Hep A Ab, Total Negative Negative   Hep B Surface Ag Negative Negative   Hep B Core Ab, Total Negative Negative   Hep B Surface AB QL  Non Reactive   Hep C Ab 0.0 - 0.9 s/co ratio <0.1   Ferritin 30 - 400 ng/mL 31   Iron % Saturation 15 - 55 % 22   LAUREN, IFA  Negative   ASMCA 0 - 19 Units 11   Alpha-1 antitrypsin level 90 - 200 mg/dL 170     LIVER HISTOLOGY:  3/2018. FibroScan performed at [address] EkPa was 31.2. IQR/med 56%. . Consistent with ALFONSO and cirrhosis. ENDOSCOPIC PROCEDURES:  1/2013. Colonoscopy performed by Dr Ollie Hutson. No polyps. 1/2013. EGD performed by Dr Ollie Hutson. No varices, gastric erythema. 4/2018. Colonoscopy performed by Dr Ollie Hutson. Reportedly normal per patient. 12/2018. EGD performed by Dr Arthur Medeiros. Small esophageal varices. No banding performed. No gastric varices. Mild portal gastropathy. Gastritis. RADIOLOGY:  12/2017. Ultrasound of liver. Normal appearing liver. No liver mass lesions. 1/2018. Ultrasound of liver. Cirrhotic liver morphology without focal liver mass. No ascites. Status post cholecystectomy. OTHER TESTING:  Not available or performed    ASSESSMENT AND PLAN:  Suspected ALFONSO with cirrhosis. Liver transaminases are variably elevated.    Liver function is normal.  The platelet count is normal.      I have reviewed in detail with the patient the natural history and recommended follow-up of ALFONSO cirrhosis. This will include surveillance for [address] and varices as well as monitoring for fluid overload and liver function. [address] screening. Will continue to follow AFP and I have ordered repeat US of the liver for 1/2019, this will be repeated every 6 months. EGD to evaluate for esophageal varices and need to administer treatment to reduce risk of first variceal bleed was recently performed and shows small varices, no banding performed. Repeat in 6-12 months. He has some gastritis and has complained of increased dyspepsia symptoms. Will trial of increasing OTC dosing of Prevacid for the next 2 weeks and resumption of daily thereafter. He will contact me with any change in response to this suggestion. The patient was counseled regarding diet and exercise to achieve weight loss. He is carefully following calories and activity levels, with a goal of 200#. We have discussed means to target specific changes to support weight loss goals and better management of diet and glucose levels. The only medical treatments for ALFONSO are though clinical trials. The patient was offered participation in a clinical trial for treatment of ALFONSO. The patient would like to defer participation in a clinical trial at this time. The patient was directed to continue all current medications at the current dosages. There are no contraindications for the patient to take any medications that are necessary for treatment of other medical issues including medications for diabetes mellitus and hypercholesterolemia. The patient was counseled regarding alcohol consumption and that this could contribute to fatty liver disease. Vaccination for viral hepatitis A and B is recommended since the patient has no serologic evidence of previous exposure or vaccination with immunity. All of the above issues were discussed with the patient. All questions were answered.   The patient expressed a clear understanding of the above. [address] [place] [address] in 3-4 months with US in the meantime.     Natan Castro PA-C  [facility]iver [place] [facility], [address] [address]  22.  [phone]

## 2019-01-04 ENCOUNTER — HOSPITAL ENCOUNTER (OUTPATIENT)
Dept: ULTRASOUND IMAGING | Age: 64
Discharge: HOME | End: 2019-01-04
Attending: PHYSICIAN ASSISTANT
Payer: COMMERCIAL

## 2019-01-04 DIAGNOSIS — K74.60 CIRRHOSIS OF LIVER WITHOUT ASCITES, UNSPECIFIED HEPATIC CIRRHOSIS TYPE (HCC): ICD-10-CM

## 2019-01-04 PROCEDURE — 76700 US EXAM ABDOM COMPLETE: CPT

## 2019-04-12 ENCOUNTER — OFFICE VISIT (OUTPATIENT)
Dept: HEMATOLOGY | Age: 64
End: 2019-04-12

## 2019-04-12 VITALS
RESPIRATION RATE: 18 BRPM | HEIGHT: 70 IN | TEMPERATURE: 98 F | HEART RATE: 68 BPM | DIASTOLIC BLOOD PRESSURE: 59 MMHG | BODY MASS INDEX: 32.5 KG/M2 | SYSTOLIC BLOOD PRESSURE: 111 MMHG | OXYGEN SATURATION: 95 % | WEIGHT: 227 LBS

## 2019-04-12 DIAGNOSIS — K75.81 NASH (NONALCOHOLIC STEATOHEPATITIS): ICD-10-CM

## 2019-04-12 DIAGNOSIS — K74.60 CIRRHOSIS OF LIVER WITHOUT ASCITES, UNSPECIFIED HEPATIC CIRRHOSIS TYPE (HCC): Primary | ICD-10-CM

## 2019-04-12 LAB
ERYTHROCYTE [DISTWIDTH] IN BLOOD BY AUTOMATED COUNT: 14.8 % (ref 12.3–15.4)
HCT VFR BLD AUTO: 42.6 % (ref 37.5–51)
HGB BLD-MCNC: 14.5 G/DL (ref 13–17.7)
MCH RBC QN AUTO: 30.9 PG (ref 26.6–33)
MCHC RBC AUTO-ENTMCNC: 34 G/DL (ref 31.5–35.7)
MCV RBC AUTO: 91 FL (ref 79–97)
PLATELET # BLD AUTO: 141 X10E3/UL (ref 150–379)
RBC # BLD AUTO: 4.69 X10E6/UL (ref 4.14–5.8)
WBC # BLD AUTO: 10.1 X10E3/UL (ref 3.4–10.8)

## 2019-04-12 RX ORDER — SYRINGE-NEEDLE,INSULIN,0.5 ML 30 GX5/16"
SYRINGE, EMPTY DISPOSABLE MISCELLANEOUS
Refills: 3 | COMMUNITY
Start: 2019-04-09

## 2019-04-12 RX ORDER — CALCIUM CITRATE/VITAMIN D3 200MG-6.25
TABLET ORAL
Refills: 99 | COMMUNITY
Start: 2019-04-08

## 2019-04-12 RX ORDER — FLUTICASONE FUROATE, UMECLIDINIUM BROMIDE AND VILANTEROL TRIFENATATE 100; 62.5; 25 UG/1; UG/1; UG/1
POWDER RESPIRATORY (INHALATION)
Refills: 3 | COMMUNITY
Start: 2019-03-27 | End: 2019-07-24

## 2019-04-12 RX ORDER — ALBUTEROL SULFATE 90 UG/1
2 AEROSOL, METERED RESPIRATORY (INHALATION)
COMMUNITY

## 2019-04-12 NOTE — PROGRESS NOTES
70 Daryl Zamora MD, [address], [place], [place]       April Kit Dey, LIYA Green, PAJevonC    Allie Lee, ACNP-BC   Best Gaspar, LIYA Villalobos, LIYA Lowe [facility] 136    at [address]    [facility], [address] [place] [address], [facility] 22.    [phone]    FAX: [address] [place] [address] Avenue    [address], [id number] [place] [phone], [address] [facility],[address]    [address] [place] [address] Street: [phone]       Patient Care Team:  Nica Mcfarland MD as PCP - General (Geriatric Medicine)      Problem List  Date Reviewed: 4/12/2019          Codes Class Noted    Cirrhosis of liver without ascites ([facility].) ICD-10-CM: K74.60  ICD-9-CM: 571.5  6/15/2018        Type II diabetes mellitus ([facility].) ICD-10-CM: E11.9  ICD-9-CM: 250.00  5/17/2018        NAFLD (nonalcoholic fatty liver disease) ICD-10-CM: K76.0  ICD-9-CM: 571.8  2/16/2018        Chronic blood loss anemia ICD-10-CM: D50.0  ICD-9-CM: 280.0  1/12/2013        Chronic airway obstruction, not elsewhere classified ICD-10-CM: J44.9  ICD-9-CM: 131  1/11/2013        HTN (hypertension), benign ICD-10-CM: I10  ICD-9-CM: 401.1  1/11/2013        Anemia, unspecified ICD-10-CM: D64.9  ICD-9-CM: 285.9  1/11/2013        Obstructive sleep apnea (adult) (pediatric) ICD-10-CM: G47.33  ICD-9-CM: 327.23  9/78/3734        Diastolic CHF, acute on chronic [facility]) ICD-10-CM: I50.33  ICD-9-CM: 428.33, 428.0  8/23/2012        CAD (coronary artery disease) ICD-10-CM: I25.10  ICD-9-CM: 414.00  8/23/2012              Auther Reading returns to the [address] for management of 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   The active problem list, all pertinent past medical history, medications, radiologic findings and laboratory findings related to the liver disorder were reviewed with the patient. The patient is a 58 y.o.  male who was found to have elevated liver enzymes in 2017 and is suspected to have fatty liver disease. Serologic evaluation for causes of chronic liver disease were negative. Assessment of liver fibrosis was performed in the past with Fibroscan. The result was 31.2 kPa which correlates with cirrhosis. He has since had liver biopsy without complication which confirmed 10-25% steatosis and cirrhosis. Ultrasound of the liver was last performed in 1/2019. The results of the imaging suggested cirrhotic liver morphology without focal liver mass. The most recent laboratory studies indicate that the liver transaminases are elevated, ALP is normal, tests of hepatic synthetic and metabolic function are normal, and the platelet count is normal.    The patient has no symptoms which could be attributed to the liver disorder. The patient completes all daily activities without any functional limitations. The patient has not experienced pain in the right side over the liver. He has completed recent screening testing for complications of cirrhosis and has had no new issues or concerns. His only comment recently is of significant increased fatigue and stresses lately. His mother has Alzheimers and he has been spending an increased amount of time traveling to [facility] for her care. This has made DM control and weight loss an issue for him.     ALLERGIES  Allergies   Allergen Reactions    Ciprofloxacin Unknown (comments)    Ezetimibe Unknown (comments)    Penicillins Rash    Pravastatin Unknown (comments)    Simvastatin Unknown (comments)       MEDICATIONS  Current Outpatient Medications   Medication Sig    TRUE METRIX GLUCOSE TEST STRIP strip test blood sugar 3 TO FOUR TIMES daily    TRELEGY ELLIPTA 100-62.5-25 mcg inhaler INHALE 1 PUFF EVERY DAY    PEN NEEDLE 31 gauge x 3/16\" ndle USE TO GIVE INJECTIONS FOUR TIMES DAILY    metFORMIN ER (GLUCOPHAGE XR) 500 mg tablet TAKE ONE TABLET BY MOUTH EVERY DAY WITH evening meal    ergocalciferol (VITAMIN D2) 50,000 unit capsule Take 50,000 Units by mouth every seven (7) days.  lansoprazole (PREVACID PO) Take  by mouth.  exenatide microspheres (BYDUREON BCISE) 2 mg/0.85 mL atIn by SubCUTAneous route.  insulin lispro protamine/insulin lispro (HUMALOG MIX 50-50 INSULN U-100) 100 unit/mL (50-50) injection by SubCUTAneous route.  INSULIN GLARGINE,HUM. REC. ANLOG (TOUJEO SOLOSTAR U-300 INSULIN SC) by SubCUTAneous route.  aspirin delayed-release 81 mg tablet Take  by mouth daily.  diltiazem (TIAZAC) 180 mg SR capsule Take 180 mg by mouth daily.  guaiFENesin (ORGANIDIN) 400 mg tablet Take  by mouth two (2) times a day.  atorvastatin (LIPITOR) 10 mg tablet Take 5 mg by mouth daily.  losartan (COZAAR) 50 mg tablet Take 50 mg by mouth daily.  metoprolol-XL (TOPROL-XL) 50 mg XL tablet Take 50 mg by mouth daily.  nitroglycerin (NITROSTAT) 0.4 mg SL tablet by SubLINGual route every five (5) minutes as needed.  montelukast (SINGULAIR) 10 mg tablet Take 10 mg by mouth daily.  albuterol (VENTOLIN HFA) 90 mcg/actuation inhaler Take 2 Puffs by inhalation every four (4) hours as needed.  albuterol (PROAIR HFA) 90 mcg/actuation inhaler ProAir HFA 90 mcg/actuation aerosol inhaler    fluticasone furoate (ARNUITY ELLIPTA) 100 mcg/actuation dsdv inhaler Take 1 Puff by inhalation daily.  UMECLIDINIUM BRM/VILANTEROL TR (ANORO ELLIPTA IN) Take  by inhalation. No current facility-administered medications for this visit. SYSTEM REVIEW NOT RELATED TO LIVER DISEASE OR REVIEWED ABOVE:  Constitution systems: Negative for fever, chills, weight gain. Weight stable over the course of the past 6 months. Noted increased fatigue. Eyes: Negative for visual changes. ENT: Negative for sore throat, painful swallowing.    Respiratory: Negative for cough, hemoptysis, SOB. Cardiology: Negative for chest pain, palpitations. GI:  Negative for constipation or diarrhea. : Negative for urinary frequency, dysuria, hematuria, nocturia. Skin: Negative for rash. Hematology: Negative for easy bruising, blood clots. Musculo-skeletal: Negative for back pain, muscle pain, weakness. Neurologic: Negative for headaches, dizziness, vertigo, memory problems not related to HE. Psychology: Negative for anxiety, depression. FAMILY HISTORY:  The father  of dementia. The mother has the following chronic diseases: dementia. There is no family history of liver disease. SOCIAL HISTORY:  The patient is . The patient has 2 children, and 3 grandchildren. The patient stopped using tobacco products in 10/1998. The patient has previously consumed alcohol socially never in excess. The patient has been abstinent from alcohol since 2015. The patient used to work as a muscician. PHYSICAL EXAMINATION:  Visit Vitals  /59 (BP 1 Location: Left arm, BP Patient Position: Sitting)   Pulse 68   Temp 98 °F (36.7 °C) (Oral)   Resp 18   Ht 5' 10\" (1.778 m)   Wt 227 lb (103 kg)   SpO2 95%   BMI 32.57 kg/m²     General: No acute distress. Eyes: Sclera anicteric. ENT: No oral lesions. Thyroid normal.  Nodes: No adenopathy. Skin: No spider angiomata. No jaundice. Positive for palmar erythema. Respiratory: Lungs clear to auscultation. Cardiovascular: Regular heart rate. No murmurs. No JVD. Abdomen: Soft non-tender. Liver edge firm as easily palpable 2-3 cm BCM. Spleen not palpable. No obvious ascites. Extremities: No edema. No muscle wasting. No gross arthritic changes. Neurologic: Alert and oriented. Cranial nerves grossly intact. No asterixis.     LABORATORY STUDIES:  From 2018  AST/ALT/ALP/T Bili/ALB:40/50/117/0.4/4.2  WBC/HB/PLT/INR:9.4/15/135  BUN/CREAT:14/0.78    Liver [place] of [address] & Units 6/15/2018 2/16/2018   WBC 3.4 - 10.8 x10E3/uL 10.0 13.0 (H)   ANC 1.4 - 7.0 x10E3/uL  7.5 (H)   HGB 13.0 - 17.7 g/dL 15.1 15.3    - 379 x10E3/uL 181 166   INR 0.8 - 1.2 1.0 1.0   AST 0 - 40 IU/L 37 49 (H)   ALT 0 - 44 IU/L 37 53 (H)   Alk Phos 39 - 117 IU/L 123 (H) 106   Bili, Total 0.0 - 1.2 mg/dL 0.3 0.2   Bili, Direct 0.00 - 0.40 mg/dL 0.11 0.11   Albumin 3.6 - 4.8 g/dL 4.2 4.0   BUN 8 - 27 mg/dL 17 18   Creat 0.76 - 1.27 mg/dL 1.02 0.78   Na 134 - 144 mmol/L 137 139   K 3.5 - 5.2 mmol/L 4.4 4.1   Cl 96 - 106 mmol/L 99 100   CO2 20 - 29 mmol/L 23 22   Glucose 65 - 99 mg/dL 72 144 (H)     Cancer Screening Latest Ref Rng & Units 6/15/2018   AFP, Serum 0.0 - 8.0 ng/mL 2.3   AFP-L3% 0.0 - 9.9 % Comment   Additional lab values drawn at today's office visit are pending at the time of documentation. SEROLOGIES:  Serologies Latest Ref Rng & Units 2/16/2018   Hep A Ab, Total Negative Negative   Hep B Surface Ag Negative Negative   Hep B Core Ab, Total Negative Negative   Hep B Surface AB QL  Non Reactive   Hep C Ab 0.0 - 0.9 s/co ratio <0.1   Ferritin 30 - 400 ng/mL 31   Iron % Saturation 15 - 55 % 22   LAUREN, IFA  Negative   ASMCA 0 - 19 Units 11   Alpha-1 antitrypsin level 90 - 200 mg/dL 170     LIVER HISTOLOGY:  3/2018. FibroScan performed at [address]. EkPa was 31.2. IQR/med 56%. . Consistent with ALFONSO and cirrhosis. ENDOSCOPIC PROCEDURES:  1/2013. Colonoscopy performed by Dr Bronwyn Foss. No polyps. 1/2013. EGD performed by Dr Bronwyn Foss. No varices, gastric erythema. 4/2018. Colonoscopy performed by Dr Bronwyn Foss. Reportedly normal per patient. 12/2018. EGD performed by Dr Gui Menon. Small esophageal varices. No banding performed. No gastric varices. Mild portal gastropathy. Gastritis. Repeat in 6 months. RADIOLOGY:  12/2017. Ultrasound of liver. Normal appearing liver. No liver mass lesions. 1/2018. Ultrasound of liver. Cirrhotic liver morphology without focal liver mass.  No ascites. Status post cholecystectomy. 1/2019. Ultrasound of liver. Echogenic consistent with cirrhosis. No liver mass lesions. No dilated bile ducts. No ascites. OTHER TESTING:  Not available or performed    ASSESSMENT AND PLAN:  Suspected ALFONSO with cirrhosis. Liver transaminases are variably elevated. Liver function is normal.  The platelet count is normal.      I have reviewed in detail with the patient the natural history and recommended follow-up of ALFONSO cirrhosis. This will include surveillance for [address] and varices as well as monitoring for fluid overload and liver function. [address] screening. Will continue to follow AFP and will repeat US of the liver again ~7/2019, will order this in conjunction with next office visit. EGD to evaluate for esophageal varices and need to administer treatment to reduce risk of first variceal bleed was recently performed and shows small varices, no banding performed. Repeat in 6/2019 was recommended. The patient was counseled regarding diet and exercise to achieve weight loss. He is carefully following calories and activity levels, with a goal of 200#. We have discussed means to target specific changes to support weight loss goals and better management of diet and glucose levels. The only medical treatments for ALFONSO are though clinical trials. The patient was offered participation in a clinical trial for treatment of ALFONSO. The patient would like to defer participation in a clinical trial at this time due to his schedule of travel. The patient was directed to continue all current medications at the current dosages. There are no contraindications for the patient to take any medications that are necessary for treatment of other medical issues including medications for diabetes mellitus and hypercholesterolemia. The patient was counseled regarding alcohol consumption and that this could contribute to fatty liver disease.     Vaccination for viral hepatitis A and B is recommended since the patient has no serologic evidence of previous exposure or vaccination with immunity. All of the above issues were discussed with the patient. All questions were answered. The patient expressed a clear understanding of the above. [address] [address] in 4 months with same day ultrasound of the liver. EGD in the meantime.      Makenna Chiang PA-C  Liver [place] [address], [address] [address]  22.  [phone]

## 2019-04-12 NOTE — PROGRESS NOTES
Luis Mcadams is a 58 y.o. male    Chief Complaint   Patient presents with    Follow-up     1. Have you been to the ER, urgent care clinic since your last visit? Hospitalized since your last visit? No     2. Have you seen or consulted any other health care providers outside of the [address] [place] [address] since your last visit? Include any pap smears or colon screening.  No    Visit Vitals  /59 (BP 1 Location: Left arm, BP Patient Position: Sitting)   Pulse 68   Temp 98 °F (36.7 °C) (Oral)   Resp 18   Ht 5' 10\" (1.778 m)   Wt 227 lb (103 kg)   SpO2 95%   BMI 32.57 kg/m²     Health Maintenance Due   Topic Date Due    FOOT EXAM Q1  05/20/1964    MICROALBUMIN Q1  05/20/1964    EYE EXAM RETINAL OR DILATED  05/20/1964    DTaP/Tdap/Td series (1 - Tdap) 05/20/1975    Pneumococcal 0-64 years (1 of 1 - PPSV23) 02/26/2001    Shingrix Vaccine Age 50> (1 of 2) 05/20/2004    FOBT Q 1 YEAR AGE 50-75  05/20/2004    LIPID PANEL Q1  08/23/2013    HEMOGLOBIN A1C Q6M  06/13/2018     Debbie Tamayo LPN

## 2019-04-13 LAB
ALBUMIN SERPL-MCNC: 4.4 G/DL (ref 3.6–4.8)
ALP SERPL-CCNC: 109 IU/L (ref 39–117)
ALT SERPL-CCNC: 42 IU/L (ref 0–44)
AST SERPL-CCNC: 31 IU/L (ref 0–40)
BILIRUB DIRECT SERPL-MCNC: 0.1 MG/DL (ref 0–0.4)
BILIRUB SERPL-MCNC: 0.2 MG/DL (ref 0–1.2)
BUN SERPL-MCNC: 17 MG/DL (ref 8–27)
BUN/CREAT SERPL: 18 (ref 10–24)
CALCIUM SERPL-MCNC: 9.2 MG/DL (ref 8.6–10.2)
CHLORIDE SERPL-SCNC: 100 MMOL/L (ref 96–106)
CO2 SERPL-SCNC: 25 MMOL/L (ref 20–29)
CREAT SERPL-MCNC: 0.96 MG/DL (ref 0.76–1.27)
GLUCOSE SERPL-MCNC: 133 MG/DL (ref 65–99)
INR PPP: 1 (ref 0.8–1.2)
POTASSIUM SERPL-SCNC: 4.7 MMOL/L (ref 3.5–5.2)
PROTHROMBIN TIME: 10.7 SEC (ref 9.1–12)
SODIUM SERPL-SCNC: 137 MMOL/L (ref 134–144)

## 2019-04-15 LAB
AFP L3 MFR SERPL: NORMAL % (ref 0–9.9)
AFP SERPL-MCNC: 2.5 NG/ML (ref 0–8)

## 2019-07-24 RX ORDER — GUAIFENESIN 100 MG/5ML
81 LIQUID (ML) ORAL DAILY
COMMUNITY

## 2019-07-24 RX ORDER — ATORVASTATIN CALCIUM 20 MG/1
20 TABLET, FILM COATED ORAL DAILY
COMMUNITY

## 2019-07-25 ENCOUNTER — ANESTHESIA (OUTPATIENT)
Dept: ENDOSCOPY | Age: 64
End: 2019-07-25
Payer: MEDICARE

## 2019-07-25 ENCOUNTER — HOSPITAL ENCOUNTER (OUTPATIENT)
Age: 64
Setting detail: OUTPATIENT SURGERY
Discharge: HOME | End: 2019-07-25
Attending: INTERNAL MEDICINE | Admitting: INTERNAL MEDICINE
Payer: MEDICARE

## 2019-07-25 ENCOUNTER — ANESTHESIA EVENT (OUTPATIENT)
Dept: ENDOSCOPY | Age: 64
End: 2019-07-25
Payer: MEDICARE

## 2019-07-25 VITALS
RESPIRATION RATE: 16 BRPM | DIASTOLIC BLOOD PRESSURE: 88 MMHG | SYSTOLIC BLOOD PRESSURE: 169 MMHG | HEIGHT: 70 IN | OXYGEN SATURATION: 97 % | BODY MASS INDEX: 32.21 KG/M2 | WEIGHT: 225 LBS | TEMPERATURE: 97 F | HEART RATE: 67 BPM

## 2019-07-25 PROCEDURE — 77030014243 HC BND LIG VRCES BSC -D: Performed by: INTERNAL MEDICINE

## 2019-07-25 PROCEDURE — 76060000031 HC ANESTHESIA FIRST 0.5 HR: Performed by: INTERNAL MEDICINE

## 2019-07-25 PROCEDURE — 76040000019: Performed by: INTERNAL MEDICINE

## 2019-07-25 PROCEDURE — 74011250636 HC RX REV CODE- 250/636

## 2019-07-25 RX ORDER — PROPOFOL 10 MG/ML
INJECTION, EMULSION INTRAVENOUS AS NEEDED
Status: DISCONTINUED | OUTPATIENT
Start: 2019-07-25 | End: 2019-07-25 | Stop reason: HOSPADM

## 2019-07-25 RX ORDER — SODIUM CHLORIDE 9 MG/ML
50 INJECTION, SOLUTION INTRAVENOUS CONTINUOUS
Status: DISCONTINUED | OUTPATIENT
Start: 2019-07-25 | End: 2019-07-25 | Stop reason: HOSPADM

## 2019-07-25 RX ORDER — MIDAZOLAM HYDROCHLORIDE 1 MG/ML
5-10 INJECTION, SOLUTION INTRAMUSCULAR; INTRAVENOUS
Status: DISCONTINUED | OUTPATIENT
Start: 2019-07-25 | End: 2019-07-25 | Stop reason: HOSPADM

## 2019-07-25 RX ORDER — DEXTROMETHORPHAN/PSEUDOEPHED 2.5-7.5/.8
1.2 DROPS ORAL
Status: DISCONTINUED | OUTPATIENT
Start: 2019-07-25 | End: 2019-07-25 | Stop reason: HOSPADM

## 2019-07-25 RX ORDER — ATROPINE SULFATE 0.1 MG/ML
0.5 INJECTION INTRAVENOUS
Status: DISCONTINUED | OUTPATIENT
Start: 2019-07-25 | End: 2019-07-25 | Stop reason: HOSPADM

## 2019-07-25 RX ORDER — FLUMAZENIL 0.1 MG/ML
0.2 INJECTION INTRAVENOUS
Status: DISCONTINUED | OUTPATIENT
Start: 2019-07-25 | End: 2019-07-25 | Stop reason: HOSPADM

## 2019-07-25 RX ORDER — SODIUM CHLORIDE 0.9 % (FLUSH) 0.9 %
5-40 SYRINGE (ML) INJECTION EVERY 8 HOURS
Status: DISCONTINUED | OUTPATIENT
Start: 2019-07-25 | End: 2019-07-25 | Stop reason: HOSPADM

## 2019-07-25 RX ORDER — EPINEPHRINE 0.1 MG/ML
1 INJECTION INTRACARDIAC; INTRAVENOUS
Status: DISCONTINUED | OUTPATIENT
Start: 2019-07-25 | End: 2019-07-25 | Stop reason: HOSPADM

## 2019-07-25 RX ORDER — FENTANYL CITRATE 50 UG/ML
50-200 INJECTION, SOLUTION INTRAMUSCULAR; INTRAVENOUS
Status: DISCONTINUED | OUTPATIENT
Start: 2019-07-25 | End: 2019-07-25 | Stop reason: HOSPADM

## 2019-07-25 RX ORDER — SODIUM CHLORIDE 0.9 % (FLUSH) 0.9 %
5-40 SYRINGE (ML) INJECTION AS NEEDED
Status: DISCONTINUED | OUTPATIENT
Start: 2019-07-25 | End: 2019-07-25 | Stop reason: HOSPADM

## 2019-07-25 RX ORDER — NALOXONE HYDROCHLORIDE 0.4 MG/ML
0.4 INJECTION, SOLUTION INTRAMUSCULAR; INTRAVENOUS; SUBCUTANEOUS
Status: DISCONTINUED | OUTPATIENT
Start: 2019-07-25 | End: 2019-07-25 | Stop reason: HOSPADM

## 2019-07-25 RX ORDER — SODIUM CHLORIDE 9 MG/ML
INJECTION, SOLUTION INTRAVENOUS
Status: DISCONTINUED | OUTPATIENT
Start: 2019-07-25 | End: 2019-07-25 | Stop reason: HOSPADM

## 2019-07-25 RX ADMIN — SODIUM CHLORIDE: 9 INJECTION, SOLUTION INTRAVENOUS at 13:02

## 2019-07-25 RX ADMIN — PROPOFOL 50 MG: 10 INJECTION, EMULSION INTRAVENOUS at 13:16

## 2019-07-25 RX ADMIN — PROPOFOL 50 MG: 10 INJECTION, EMULSION INTRAVENOUS at 13:22

## 2019-07-25 RX ADMIN — PROPOFOL 50 MG: 10 INJECTION, EMULSION INTRAVENOUS at 13:20

## 2019-07-25 NOTE — ANESTHESIA PREPROCEDURE EVALUATION
Relevant Problems   No relevant active problems       Anesthetic History   No history of anesthetic complications            Review of Systems / Medical History  Patient summary reviewed, nursing notes reviewed and pertinent labs reviewed    Pulmonary  Within defined limits  COPD    Sleep apnea           Neuro/Psych   Within defined limits           Cardiovascular  Within defined limits  Hypertension          CAD         GI/Hepatic/Renal  Within defined limits   GERD      Liver disease     Endo/Other  Within defined limits  Diabetes    Arthritis     Other Findings              Physical Exam    Airway  Mallampati: II  TM Distance: > 6 cm  Neck ROM: normal range of motion   Mouth opening: Normal     Cardiovascular  Regular rate and rhythm,  S1 and S2 normal,  no murmur, click, rub, or gallop             Dental  No notable dental hx       Pulmonary  Breath sounds clear to auscultation               Abdominal  GI exam deferred       Other Findings            Anesthetic Plan    ASA: 3  Anesthesia type: MAC            Anesthetic plan and risks discussed with: Patient

## 2019-07-25 NOTE — PROGRESS NOTES

## 2019-07-25 NOTE — DISCHARGE INSTRUCTIONS
3340 [facility], MD, [address], Cite Arlyn Rasmussen MD Gloriann Bolls, NANCY Wheeler, [facility]-BC     April S Alannah, [facility]   Kishore Kelly, BERT-C    Madelaine Agarwal, [facility]       Demetriadaniel Lowe [address]    at [facility]    [address], [address] Shabana Loomis  22.    [phone]    FAX: [address] [place] [address], [address]    [phone]    FAX: [phone]         ENDOSCOPY WITH BANDING DISCHARGE INSTRUCTIONS    Alyson Holley  1955  Date: 7/25/2019    DISCOMFORT:   warm salt water gargle for sore thoat  Apply warm compress to IV site if red. If redness or soreness persists call the office. You may experience gas and bloating. Walking and belching will help relieve this. You may experience chest pain or discomfort or feel as though food is \"sticking\" in your food pipe for a few days after the procedure. This is a normal feeling after banding of esophageal varices. CHEST PRESSURE:  Banding of dilated veins (varices) in the food tube (esophagus) can be painful in some persons. The pain is caused by squeezing the varices and lining of the esophagus by the bands used to seal the varices. You can take liquid pain medication either acetaminophen or alternative. The best treatment for this is to drink a an \"Icee\" or \"Slurpee\" since the ice crystals will freeze the nerve endings in the food tube and relieve the pain. DIET:  Regular food may dislodge the bands placed on the varices. For this reason you should only have liquid for the rest of today. Eat only soft food that does not need to be chewed all day tomorrow. You may advance to your regular diet in 2 days.     ACTIVITY:  Spend the remainder of the day resting. Avoid any strenuous activity. You may not operate a vehicle for 12 hours. You may not engage in an occupation involving machinery or appliances for rest of today. Avoid making any critical decisions for at least 24 hour. Call the [facility] [address] [facility] if you have any of the following:  Increasing chest or abdominal pain, nausea, vomiting, vomiting blood, abdominal distension or swelling, fever or chills, bloody discharge from nose or mouth or shortness of breath. Follow-up Instructions:  Call Dr. Luz Moore for any questions or problems at the phone number listed above. If a biopsy was performed, you will be contacted by the office staff or Dr Luz Moore within 1 week. If you have not heard from us by then you may call the office at the phone number listed above to inquire about the results. ENDOSCOPY FINDINGS:  Multiple varices were found in the esophagus (food tube). 4 bands were placed to seal the varices and reduce the risk of bleeding. DISCHARGE SUMMARY from the Nurse: The following personal items collected during your admission are returned to you:   Dental Appliance: Dental Appliances: Partials, Uppers  Vision: Visual Aid: Glasses, At bedside  Hearing Aid:    Sandria Render:    Clothing:    Other Valuables:    Valuables sent to safe:    Patient Education        Esophageal Varices: Care Instructions  Your Care Instructions    Esophageal varices (say \"ee-sof-uh-ZACHARY-ul CYGX-xt-wmlv\") are veins in your esophagus that are bigger than normal. Your esophagus is a tube. It carries food from your throat to your stomach. These veins get big because of extra pressure. This pressure makes the walls of the veins weak. Then they can rupture and cause very serious bleeding. This problem is usually found in people who have serious liver disease. Treatments include medicines and procedures to help lower the pressure in the veins. Talk with your doctor about the best treatment for you.   If you have bleeding from this problem, there is a risk that it will happen again. In this case, it's important to go back and follow up with your doctor. Follow-up care is a key part of your treatment and safety. Be sure to make and go to all appointments, and call your doctor if you are having problems. It's also a good idea to know your test results and keep a list of the medicines you take. How can you care for yourself at home? · Be safe with medicines. Take your medicines exactly as prescribed. Call your doctor if you think you are having a problem with your medicine. You will get more details on the specific medicines your doctor prescribes. · Talk to your doctor before you take any other medicines. These include over-the-counter medicines, vitamins, and herbal products. · Avoid aspirin, ibuprofen (Advil, Motrin), and naproxen (Aleve). These can cause sores in your stomach or esophagus. · Do not drink alcohol. It increases your risk of bleeding. It can also make liver damage worse. Tell your doctor if you need help to quit. Counseling, support groups, and sometimes medicines can help you stay sober. When should you call for help? Call 911 anytime you think you may need emergency care. For example, call if:    · You have trouble breathing.     · You vomit blood or what looks like coffee grounds.    Call your doctor now or seek immediate medical care if:    · You feel very sleepy or confused.     · You have new or worse belly pain.     · You have a fever.     · There is a new or increasing yellow tint to your skin or the whites of your eyes.     · You have any abnormal bleeding, such as:  ? Nosebleeds. ? Vaginal bleeding that is different (heavier, more frequent, at a different time of the month) than what you are used to.  ? Bloody or black stools, or rectal bleeding. ? Bloody or pink urine.    Watch closely for changes in your health, and be sure to contact your doctor if:    · You have any problems.   · Your belly is getting bigger.     · You are gaining weight. Where can you learn more? Go to http://cuba-brett.info/. Enter I887 in the search box to learn more about \"Esophageal Varices: Care Instructions. \"  Current as of: November 7, 2018  Content Version: 12.1  © 1738-6892 [employer]wise, [employer]. Care instructions adapted under license by [employer] (which disclaims liability or warranty for this information). If you have questions about a medical condition or this instruction, always ask your healthcare professional. [address] any warranty or liability for your use of this information.

## 2019-07-25 NOTE — PROGRESS NOTES
Preethi Elise  1955  [id number]    Situation:  Verbal report received from: Gaby Edwards  Procedure: Procedure(s):  ESOPHAGOGASTRODUODENOSCOPY (EGD)  ENDOSCOPIC BANDING OR LIGATION    Background:    Preoperative diagnosis: CIRRHOSIS OF LIVER WITHOUT ASCITES  Postoperative diagnosis: Esophageal Varices-4 bands  Portal Gastropathy    :  uSry Angela  Assistant(s): Endoscopy Technician-1: Janice MALDONADO  Endoscopy RN-1: Leslie Giles RN    Specimens: * No specimens in log *  H. Pylori  no    Assessment:  Intra-procedure medications   Anesthesia gave intra-procedure sedation and medications, see anesthesia flow sheet yes    Intravenous fluids: NS@ KVO     Vital signs stable     Abdominal assessment: round and soft     Recommendation:  Discharge patient per MD order.     Family or Friend   Permission to share finding with family or friend yes

## 2019-07-25 NOTE — H&P
[facility] Nav Mauro MD Geraldo Binder, NANCY Regalado, ACNP-BC     April CECILIA Jaffe, [facility]NP-BC   Onofre Goldman FNP-C    Domonique Rey, [facility]       Demetria Deputado Terr[address]    at [facility]    [address], [address] [place]    [address] [facility] 22. [phone]    FAX: [address] [place] [address], [address]    [phone]    FAX: [phone]         PRE-PROCEDURE NOTE - EGD    H and P from last office visit reviewed. Allergies reviewed. Out-patient medicaton list reviewed. Patient Active Problem List   Diagnosis Code    Diastolic CHF, acute on chronic (HCC) I50.33    CAD (coronary artery disease) I25.10    Chronic airway obstruction, not elsewhere classified J44.9    HTN (hypertension), benign I10    Anemia, unspecified D64.9    Obstructive sleep apnea (adult) (pediatric) G47.33    Chronic blood loss anemia D50.0    NAFLD (nonalcoholic fatty liver disease) K76.0    Type II diabetes mellitus (HCC) E11.9    Cirrhosis of liver without ascites (HCC) K74.60       Allergies   Allergen Reactions    Ciprofloxacin Hives    Ezetimibe Other (comments)     Muscle pain.  Penicillins Rash    Pravastatin Other (comments)     Muscle pain.  Simvastatin Other (comments)     Muscle pain. No current facility-administered medications on file prior to encounter. Current Outpatient Medications on File Prior to Encounter   Medication Sig Dispense Refill    atorvastatin (LIPITOR) 20 mg tablet Take 20 mg by mouth daily.  umeclidinium-vilanterol (ANORO ELLIPTA) 62.5-25 mcg/actuation inhaler Take 1 Puff by inhalation daily.  aspirin 81 mg chewable tablet Take 81 mg by mouth daily.       exenatide microspheres (BYDUREON SC) 2 Units by SubCUTAneous route every seven (7) days.  insulin glargine,hum.rec.anlog (TOUJEO SOLOSTAR U-300 INSULIN SC) by SubCUTAneous route. 26-36 units subcutaneous at night depending on BS.  insulin lispro (HUMALOG KWIKPEN INSULIN SC) 3-7 Units by SubCUTAneous route three (3) times daily.  ergocalciferol (VITAMIN D2) 50,000 unit capsule Take 50,000 Units by mouth every seven (7) days.  lansoprazole (PREVACID PO) Take  by mouth. OTC. Takes 2 po in am.      fluticasone furoate (ARNUITY ELLIPTA) 100 mcg/actuation dsdv inhaler Take 1 Puff by inhalation daily.  diltiazem (TIAZAC) 180 mg SR capsule Take 180 mg by mouth daily.  guaiFENesin (ORGANIDIN) 400 mg tablet Take 400 mg by mouth daily.  losartan (COZAAR) 50 mg tablet Take 50 mg by mouth daily.  metoprolol-XL (TOPROL-XL) 50 mg XL tablet Take 50 mg by mouth daily.  nitroglycerin (NITROSTAT) 0.4 mg SL tablet by SubLINGual route every five (5) minutes as needed.  montelukast (SINGULAIR) 10 mg tablet Take 10 mg by mouth daily.  TRUE METRIX GLUCOSE TEST STRIP strip test blood sugar 3 TO FOUR TIMES daily  99    PEN NEEDLE 31 gauge x 3/16\" ndle USE TO GIVE INJECTIONS FOUR TIMES DAILY  3    albuterol (PROAIR HFA) 90 mcg/actuation inhaler Take 2 Puffs by inhalation every four (4) hours as needed.  albuterol (VENTOLIN HFA) 90 mcg/actuation inhaler Take 2 Puffs by inhalation every four (4) hours as needed. For EGD to assess for esophageal and gastric varices. Plan to perform banding if indicated based upon variceal size and appearance. The risks of the procedure were discussed with the patient. These included reaction to anesthesia, pain, perforation and bleeding. All questions were answered. The patient wishes to proceed with the procedure. PHYSICAL EXAMINATION:  VS: per nursing note  General: No acute distress. Eyes: Sclera anicteric.    ENT: No oral lesions. Thyroid normal.  Nodes: No adenopathy. Skin: No spider angiomata. No jaundice. No palmar erythema. Respiratory: Lungs clear to auscultation. Cardiovascular: Regular heart rate. No murmurs. No JVD. Abdomen: Soft non-tender, liver size normal to percussion/palpation. Spleen not palpable. No obvious ascites. Extremities: No edema. No muscle wasting. No gross arthritic changes. Neurologic: Alert and oriented. Cranial nerves grossly intact. No asterixis. MOST RECENT LABORATORY STUDIES:  Lab Results   Component Value Date/Time    WBC 10.1 04/12/2019 08:39 AM    Hemoglobin (POC) 13.3 12/05/2012 12:23 PM    HGB 14.5 04/12/2019 08:39 AM    Hematocrit (POC) 39 12/05/2012 12:23 PM    HCT 42.6 04/12/2019 08:39 AM    PLATELET 628 (L) 57/08/3498 08:39 AM    MCV 91 04/12/2019 08:39 AM     Lab Results   Component Value Date/Time    INR 1.0 04/12/2019 08:39 AM    INR 1.0 06/15/2018 08:59 AM    INR 1.0 02/16/2018 12:00 AM    Prothrombin time 10.7 04/12/2019 08:39 AM    Prothrombin time 10.9 06/15/2018 08:59 AM    Prothrombin time 10.8 02/16/2018 12:00 AM       ASSESSMENT AND PLAN:  EGD to assess for esophageal and/or gastric varices.   Sedation per anesthesiology    Min Funk MD  [address] [place] [address] A, [address] [place] [address] 22.  [phone]  [address] [place] [address]

## 2019-07-25 NOTE — PROCEDURES
3340 [facility], MD, [address], Cite Ronny Rasmussen MD Norberto Martinet, PA-C Natha Carrow, [facility]-MONIKA Jaffe, [facility]   LANA Thorpe, [facility]       Demetria Lowe [facility] 136    at [address], [facility] Shabana Loomis  22.    [phone]    FAX: [address] [place] [address], [address] [place] [address] - [address]    [phone]    FAX: [phone]         UPPER ENDOSCOPY PROCEDURE NOTE    Fabrizio Tamez  1955    INDICATION: Cirrhosis. Screening for esophageal varices with variceal ligation if indicated. : Sarah Paris MD    ANESTHESIA/SEDATION: Propofol was administered by anesthesia      PROCEDURE DESCRIPTION:  [employer] consent was obtained from the patient for the procedure. All risks and benefits of the procedure explained. The patient was taken to the endoscopy suite and placed in the left lateral decubitus position. Following sequential administration of sedation to doses as indicated above the endoscope was inserted into the mouth and advanced under direct vision to the second portion of the duodenum. Careful inspection of upper gastrointestinal tract was made as the endoscope was inserted and withdrawn. Retroflexion of the endoscope to view of the cardia of the stomach was performed. After withdrawing the endoscope the banding devise was placed on the tip of the endoscope. The scope was then reinserted under direct inspection and advanced to the esophagus. Banding of esophageal varices was performed as described below. The scope was then removed. FINDINGS:  Esophagus:    A few medium esophageal varices were identified. Banding of esophageal varices was performed.   Excellent hemostasis was achieved after banding. Stomach:   Mild portal hypertensive gastropathy of the body of the stomach  No gastric varices identified. Duodenum:   Normal bulb and second portion    INTERVENTION:   4 bands placed were placed on esophageal varices. COMPLICATIONS: None. The patient tolerated the procedure well. EBL: Negligible. RECOMMENDATIONS:  Observe until discharge parameters are achieved. Liquid diet today. Soft food tomorrow. Resume general diet thereafter. Repeat endoscopy to reassess varices and need for additional banding in 6 months. Follow-up [facility] office as scheduled.       MD [facility] [address] [address] [address], [address] [place] [address] 22.  [phone]  [address] [place] [address]

## 2019-09-03 ENCOUNTER — HOSPITAL ENCOUNTER (OUTPATIENT)
Dept: ULTRASOUND IMAGING | Age: 64
Discharge: HOME | End: 2019-09-03
Attending: PHYSICIAN ASSISTANT
Payer: MEDICARE

## 2019-09-03 DIAGNOSIS — K75.81 NASH (NONALCOHOLIC STEATOHEPATITIS): ICD-10-CM

## 2019-09-03 DIAGNOSIS — K74.60 CIRRHOSIS OF LIVER WITHOUT ASCITES, UNSPECIFIED HEPATIC CIRRHOSIS TYPE (HCC): ICD-10-CM

## 2019-09-03 PROCEDURE — 76700 US EXAM ABDOM COMPLETE: CPT

## 2019-09-04 ENCOUNTER — OFFICE VISIT (OUTPATIENT)
Dept: HEMATOLOGY | Age: 64
End: 2019-09-04

## 2019-09-04 VITALS
SYSTOLIC BLOOD PRESSURE: 155 MMHG | TEMPERATURE: 98.5 F | BODY MASS INDEX: 32.57 KG/M2 | WEIGHT: 227 LBS | OXYGEN SATURATION: 96 % | DIASTOLIC BLOOD PRESSURE: 74 MMHG | HEART RATE: 73 BPM

## 2019-09-04 DIAGNOSIS — K75.81 NASH (NONALCOHOLIC STEATOHEPATITIS): ICD-10-CM

## 2019-09-04 DIAGNOSIS — K74.60 CIRRHOSIS OF LIVER WITHOUT ASCITES, UNSPECIFIED HEPATIC CIRRHOSIS TYPE (HCC): Primary | ICD-10-CM

## 2019-09-04 NOTE — PROGRESS NOTES
[address] [place] [address], MD, MD Gaby Ocampo PA-C Talmage Stains, ACN-BC     April S Alannah, [facility] Gilbert[facility]-BC   Ny Alvarez FNP-EDWIN Connor, [facility]       Demetria [address]    at [address], [address] [place] [address], [place] [address].    [phone]    FAX: [address] [place] [address] Avenue    [address], [address]    [phone]    FAX: [phone]     Patient Care Team:  Al Becker MD as PCP - General (Geriatric Medicine)      Problem List  Date Reviewed: 4/12/2019          Codes Class Noted    Cirrhosis of liver without ascites ([facility] ICD-10-CM: K74.60  ICD-9-CM: 571.5  6/15/2018        Type II diabetes mellitus ([facility] ICD-10-CM: E11.9  ICD-9-CM: 250.00  5/17/2018        NAFLD (nonalcoholic fatty liver disease) ICD-10-CM: K76.0  ICD-9-CM: 571.8  2/16/2018        Chronic blood loss anemia ICD-10-CM: D50.0  ICD-9-CM: 280.0  1/12/2013        Chronic airway obstruction, not elsewhere classified ICD-10-CM: J44.9  ICD-9-CM: 114  1/11/2013        HTN (hypertension), benign ICD-10-CM: I10  ICD-9-CM: 401.1  1/11/2013        Anemia, unspecified ICD-10-CM: D64.9  ICD-9-CM: 285.9  1/11/2013        Obstructive sleep apnea (adult) (pediatric) ICD-10-CM: G47.33  ICD-9-CM: 327.23  0/56/9043        Diastolic CHF, acute on chronic [facility]) ICD-10-CM: I50.33  ICD-9-CM: 428.33, 428.0  8/23/2012        CAD (coronary artery disease) ICD-10-CM: I25.10  ICD-9-CM: 414.00  8/23/2012            Alyson Nj returns to the [address] for management of 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   The active problem list, all pertinent past medical history, medications, radiologic findings and laboratory findings related to the liver disorder were reviewed with the patient. The patient is a 71 y.o.  male who was found to have elevated liver enzymes in 2017 and is suspected to have fatty liver disease. Serologic evaluation for causes of chronic liver disease were negative. Assessment of liver fibrosis was performed in the past with Fibroscan. The result was 31.2 kPa which correlates with cirrhosis. He has since had liver biopsy without complication which confirmed 10-25% steatosis and cirrhosis. Ultrasound of the liver was last performed in 9/2019. The results of the imaging suggested cirrhotic liver morphology without focal liver mass. He has continued to undergo EGD for screening of varices and these have been banded on past EGD in 7/2019. No evidence of bleeding and next will be due in 1/2020. He had some substernal pain for 3-4 days following this procedure. The most recent laboratory studies indicate that the liver transaminases are elevated, ALP is normal, tests of hepatic synthetic and metabolic function are normal, and the platelet count is normal.    The patient has no symptoms which could be attributed to the liver disorder. The patient completes all daily activities without any functional limitations. The patient has not experienced pain in the right side over the liver. He has completed recent screening testing for complications of cirrhosis and has had no new issues or concerns. His only comment recently is of significant increased fatigue and stresses lately. His mother has Alzheimers and he has been spending an increased amount of time traveling to [facility] for her care. This has made DM control and weight loss an issue for him. He has had no weight loss or gain. ALLERGIES  Allergies   Allergen Reactions    Ciprofloxacin Hives    Ezetimibe Other (comments)     Muscle pain.  Penicillins Rash    Pravastatin Other (comments)     Muscle pain.     Simvastatin Other (comments)     Muscle pain. MEDICATIONS  Current Outpatient Medications   Medication Sig    atorvastatin (LIPITOR) 20 mg tablet Take 20 mg by mouth daily.  umeclidinium-vilanterol (ANORO ELLIPTA) 62.5-25 mcg/actuation inhaler Take 1 Puff by inhalation daily.  aspirin 81 mg chewable tablet Take 81 mg by mouth daily.  exenatide microspheres (BYDUREON SC) 2 Units by SubCUTAneous route every seven (7) days.  insulin glargine,hum.rec.anlog (TOUJEO SOLOSTAR U-300 INSULIN SC) by SubCUTAneous route. 26-36 units subcutaneous at night depending on BS.  insulin lispro (HUMALOG KWIKPEN INSULIN SC) 3-7 Units by SubCUTAneous route three (3) times daily.  TRUE METRIX GLUCOSE TEST STRIP strip test blood sugar 3 TO FOUR TIMES daily    PEN NEEDLE 31 gauge x 3/16\" ndle USE TO GIVE INJECTIONS FOUR TIMES DAILY    albuterol (PROAIR HFA) 90 mcg/actuation inhaler Take 2 Puffs by inhalation every four (4) hours as needed.  ergocalciferol (VITAMIN D2) 50,000 unit capsule Take 50,000 Units by mouth every seven (7) days.  lansoprazole (PREVACID PO) Take  by mouth. OTC. Takes 2 po in am.    fluticasone furoate (ARNUITY ELLIPTA) 100 mcg/actuation dsdv inhaler Take 1 Puff by inhalation daily.  diltiazem (TIAZAC) 180 mg SR capsule Take 180 mg by mouth daily.  guaiFENesin (ORGANIDIN) 400 mg tablet Take 400 mg by mouth daily.  losartan (COZAAR) 50 mg tablet Take 50 mg by mouth daily.  metoprolol-XL (TOPROL-XL) 50 mg XL tablet Take 50 mg by mouth daily.  nitroglycerin (NITROSTAT) 0.4 mg SL tablet by SubLINGual route every five (5) minutes as needed.  montelukast (SINGULAIR) 10 mg tablet Take 10 mg by mouth daily.  albuterol (VENTOLIN HFA) 90 mcg/actuation inhaler Take 2 Puffs by inhalation every four (4) hours as needed. No current facility-administered medications for this visit.         SYSTEM REVIEW NOT RELATED TO LIVER DISEASE OR REVIEWED ABOVE:  Constitution systems: Negative for fever, chills, weight gain. Weight stable over the course of the past 12 months. Noted increased fatigue. Eyes: Negative for visual changes. ENT: Negative for sore throat, painful swallowing. Respiratory: Negative for cough, hemoptysis, SOB. Cardiology: Negative for chest pain, palpitations. GI:  Negative for constipation or diarrhea. : Negative for urinary frequency, dysuria, hematuria, nocturia. Skin: Negative for rash. Hematology: Negative for easy bruising, blood clots. Musculo-skeletal: Negative for back pain, muscle pain, weakness. Neurologic: Negative for headaches, dizziness, vertigo, memory problems not related to HE. Psychology: Negative for anxiety, depression. FAMILY HISTORY:  The father  of dementia. The mother has the following chronic diseases: dementia. There is no family history of liver disease. SOCIAL HISTORY:  The patient is . The patient has 2 children, and 3 grandchildren. The patient stopped using tobacco products in 10/1998. The patient has previously consumed alcohol socially never in excess. The patient has been abstinent from alcohol since 2015. The patient used to work as a muscician. PHYSICAL EXAMINATION:  Visit Vitals  /74 (BP 1 Location: Left arm, BP Patient Position: Sitting)   Pulse 73   Temp 98.5 °F (36.9 °C) (Tympanic)   Wt 227 lb (103 kg)   SpO2 96%   BMI 32.57 kg/m²     General: No acute distress. Eyes: Sclera anicteric. ENT: No oral lesions. Thyroid normal.  Nodes: No adenopathy. Skin: No spider angiomata. No jaundice. Positive for palmar erythema. Respiratory: Lungs clear to auscultation. Cardiovascular: Regular heart rate. No murmurs. No JVD. Abdomen: Soft non-tender. Liver edge firm as easily palpable 2-3 cm BCM. Spleen not palpable. No obvious ascites. Extremities: No edema. No muscle wasting. No gross arthritic changes. Neurologic: Alert and oriented.   Cranial nerves grossly intact. No asterixis. LABORATORY STUDIES:  [facility] [place] of [address] [place] [address] Units 4/12/2019 6/15/2018   WBC 3.4 - 10.8 x10E3/uL 10.1 10.0   ANC 1.4 - 7.0 x10E3/uL     HGB 13.0 - 17.7 g/dL 14.5 15.1    - 379 x10E3/uL 141 (L) 181   INR 0.8 - 1.2 1.0 1.0   AST 0 - 40 IU/L 31 37   ALT 0 - 44 IU/L 42 37   Alk Phos 39 - 117 IU/L 109 123 (H)   Bili, Total 0.0 - 1.2 mg/dL 0.2 0.3   Bili, Direct 0.00 - 0.40 mg/dL 0.10 0.11   Albumin 3.6 - 4.8 g/dL 4.4 4.2   BUN 8 - 27 mg/dL 17 17   Creat 0.76 - 1.27 mg/dL 0.96 1.02   Na 134 - 144 mmol/L 137 137   K 3.5 - 5.2 mmol/L 4.7 4.4   Cl 96 - 106 mmol/L 100 99   CO2 20 - 29 mmol/L 25 23   Glucose 65 - 99 mg/dL 133 (H) 72     [facility] [place] of [address] [place] [address] Ref Rng & Units 2/16/2018   WBC 3.4 - 10.8 x10E3/uL 13.0 (H)   ANC 1.4 - 7.0 x10E3/uL 7.5 (H)   HGB 13.0 - 17.7 g/dL 15.3    - 379 x10E3/uL 166   INR 0.8 - 1.2 1.0   AST 0 - 40 IU/L 49 (H)   ALT 0 - 44 IU/L 53 (H)   Alk Phos 39 - 117 IU/L 106   Bili, Total 0.0 - 1.2 mg/dL 0.2   Bili, Direct 0.00 - 0.40 mg/dL 0.11   Albumin 3.6 - 4.8 g/dL 4.0   BUN 8 - 27 mg/dL 18   Creat 0.76 - 1.27 mg/dL 0.78   Na 134 - 144 mmol/L 139   K 3.5 - 5.2 mmol/L 4.1   Cl 96 - 106 mmol/L 100   CO2 20 - 29 mmol/L 22   Glucose 65 - 99 mg/dL 144 (H)     Cancer Screening Latest Ref Rng & Units 4/12/2019 6/15/2018   AFP, Serum 0.0 - 8.0 ng/mL 2.5 2.3   AFP-L3% 0.0 - 9.9 % Comment Comment   Additional lab values drawn at today's office visit are pending at the time of documentation. SEROLOGIES:  Serologies Latest Ref Rng & Units 2/16/2018   Hep A Ab, Total Negative Negative   Hep B Surface Ag Negative Negative   Hep B Core Ab, Total Negative Negative   Hep B Surface AB QL  Non Reactive   Hep C Ab 0.0 - 0.9 s/co ratio <0.1   Ferritin 30 - 400 ng/mL 31   Iron % Saturation 15 - 55 % 22   LAUREN, IFA  Negative   ASMCA 0 - 19 Units 11   Alpha-1 antitrypsin level 90 - 200 mg/dL 170     LIVER HISTOLOGY:  3/2018. [facility]. EkPa was 31.2. IQR/med 56%. . Consistent with ALFONSO and cirrhosis. ENDOSCOPIC PROCEDURES:  1/2013. Colonoscopy performed by Dr Esha Montano. No polyps. 1/2013. EGD performed by Dr Esha Montano. No varices, gastric erythema. 4/2018. Colonoscopy performed by Dr Esha Montano. Reportedly normal per patient. 12/2018. EGD performed by Dr Pola Petty. Small esophageal varices. No banding performed. No gastric varices. Mild portal gastropathy. Gastritis. Repeat in 6 months.   7/2019. EGD performed by Dr Pola Petty. Medium esophageal varices. Banding performed x 4. No gastric varices. Mild portal gastropathy. Repeat in 6 months. RADIOLOGY:  12/2017. Ultrasound of liver. Normal appearing liver. No liver mass lesions. 1/2018. Ultrasound of liver. Cirrhotic liver morphology without focal liver mass. No ascites. Status post cholecystectomy. 1/2019. Ultrasound of liver. Echogenic consistent with cirrhosis. No liver mass lesions. No dilated bile ducts. No ascites. 9/2019. Ultrasound of liver. Echogenic consistent with cirrhosis. No liver mass lesions. No dilated bile ducts. No ascites. Borderline splenomegaly. OTHER TESTING:  Not available or performed    ASSESSMENT AND PLAN:  Suspected ALFONSO with cirrhosis. Liver transaminases are variably elevated. Liver function is normal.  The platelet count is normal.      I have reviewed in detail with the patient the natural history and recommended follow-up of ALFONSO cirrhosis. This will include surveillance for [address] and varices as well as monitoring for fluid overload and liver function. [address] screening. Will continue to follow AFP and will repeat US of the liver every 6 months, this was negative for mass/lesion or ascites this morning. Next study due in 3/2020, this was ordered in conjunction with his next office visit.       EGD to evaluate for esophageal varices and need to administer treatment to reduce risk of first variceal bleed was recently performed and shows medium varices, banding performed x 4. Repeat in 1/2020 was recommended. The patient was counseled regarding diet and exercise to achieve weight loss. He is carefully following calories and activity levels, with a goal of 200#. We have discussed means to target specific changes to support weight loss goals and better management of diet and glucose levels. The only medical treatments for ALFONSO are though clinical trials. The patient was offered participation in a clinical trial for treatment of ALFONSO. The patient would like to defer participation in a clinical trial at this time due to his schedule of travel. The patient was directed to continue all current medications at the current dosages. There are no contraindications for the patient to take any medications that are necessary for treatment of other medical issues including medications for diabetes mellitus and hypercholesterolemia. The patient was counseled regarding alcohol consumption and that this could contribute to fatty liver disease. Vaccination for viral hepatitis A and B is recommended since the patient has no serologic evidence of previous exposure or vaccination with immunity. All of the above issues were discussed with the patient. All questions were answered. The patient expressed a clear understanding of the above. [address] [place] [address] in 6 months with same day ultrasound of the liver. EGD in the meantime, 1/2020.      Fina Gonzalez PA-C  [facility] [place] of [address] [place] [address], [address] [address]  22.  [phone]

## 2019-09-04 NOTE — PROGRESS NOTES
1. Have you been to the ER, urgent care clinic since your last visit? Hospitalized since your last visit? No    2. Have you seen or consulted any other health care providers outside of the [address] [place] [address] since your last visit? Include any pap smears or colon screening. No     Chief Complaint   Patient presents with    Follow-up       Visit Vitals  /74 (BP 1 Location: Left arm, BP Patient Position: Sitting)   Pulse 73   Temp 98.5 °F (36.9 °C) (Tympanic)   Wt 227 lb (103 kg)   SpO2 96%   BMI 32.57 kg/m²     3 most recent PHQ Screens 4/12/2019   Little interest or pleasure in doing things Not at all   Feeling down, depressed, irritable, or hopeless Not at all   Total Score PHQ 2 0     Abuse Screening Questionnaire 12/12/2018   Do you ever feel afraid of your partner? N   Are you in a relationship with someone who physically or mentally threatens you? N   Is it safe for you to go home?  Y     Learning Assessment 4/12/2019   PRIMARY LEARNER Patient   BARRIERS PRIMARY LEARNER -   CO-LEARNER CAREGIVER -   PRIMARY LANGUAGE ENGLISH   LEARNER PREFERENCE PRIMARY VIDEOS   ANSWERED BY patient   RELATIONSHIP SELF

## 2019-09-05 LAB
AFP L3 MFR SERPL: NORMAL % (ref 0–9.9)
AFP SERPL-MCNC: 2.2 NG/ML (ref 0–8)
ALBUMIN SERPL-MCNC: 3.9 G/DL (ref 3.6–4.8)
ALP SERPL-CCNC: 111 IU/L (ref 39–117)
ALT SERPL-CCNC: 56 IU/L (ref 0–44)
AST SERPL-CCNC: 51 IU/L (ref 0–40)
BILIRUB DIRECT SERPL-MCNC: 0.1 MG/DL (ref 0–0.4)
BILIRUB SERPL-MCNC: <0.2 MG/DL (ref 0–1.2)
BUN SERPL-MCNC: 15 MG/DL (ref 8–27)
BUN/CREAT SERPL: 15 (ref 10–24)
CALCIUM SERPL-MCNC: 9 MG/DL (ref 8.6–10.2)
CHLORIDE SERPL-SCNC: 99 MMOL/L (ref 96–106)
CO2 SERPL-SCNC: 24 MMOL/L (ref 20–29)
CREAT SERPL-MCNC: 0.97 MG/DL (ref 0.76–1.27)
ERYTHROCYTE [DISTWIDTH] IN BLOOD BY AUTOMATED COUNT: 13.7 % (ref 12.3–15.4)
GLUCOSE SERPL-MCNC: 144 MG/DL (ref 65–99)
HCT VFR BLD AUTO: 42.8 % (ref 37.5–51)
HGB BLD-MCNC: 14.3 G/DL (ref 13–17.7)
INR PPP: 1.1 (ref 0.8–1.2)
MCH RBC QN AUTO: 30.7 PG (ref 26.6–33)
MCHC RBC AUTO-ENTMCNC: 33.4 G/DL (ref 31.5–35.7)
MCV RBC AUTO: 92 FL (ref 79–97)
PLATELET # BLD AUTO: 131 X10E3/UL (ref 150–450)
POTASSIUM SERPL-SCNC: 4.4 MMOL/L (ref 3.5–5.2)
PROTHROMBIN TIME: 11.2 SEC (ref 9.1–12)
RBC # BLD AUTO: 4.66 X10E6/UL (ref 4.14–5.8)
SODIUM SERPL-SCNC: 136 MMOL/L (ref 134–144)
WBC # BLD AUTO: 9.8 X10E3/UL (ref 3.4–10.8)

## 2019-09-09 NOTE — PROGRESS NOTES
Pt notified of stable findings, modest increase in liver enzymes with stable functions. Follow-up as scheduled.

## 2019-12-26 ENCOUNTER — TELEPHONE (OUTPATIENT)
Dept: HEMATOLOGY | Age: 64
End: 2019-12-26

## 2019-12-26 NOTE — TELEPHONE ENCOUNTER
----- Message from Marilyn Marie sent at 12/26/2019 12:53 PM EST -----  Regarding: FW: Dr. Jossie Felton  Call pt to r/s  ----- Message -----  From: Makaushal Alan  Sent: 12/26/2019  10:47 AM EST  To: [facility]  Subject: Dr. Sherrell Paniagua first and last name: Carlos Eduardo Brennan      Reason for call: requesting a call back to reschedule his January 9,2020 appointment      Callback required yes/no and why: yes      Best contact number(s): [phone]      Details to clarify the request:      Richard Brady

## 2020-01-22 ENCOUNTER — ANESTHESIA EVENT (OUTPATIENT)
Dept: ENDOSCOPY | Age: 65
End: 2020-01-22
Payer: MEDICARE

## 2020-01-22 NOTE — ANESTHESIA PREPROCEDURE EVALUATION
Relevant Problems   No relevant active problems       Anesthetic History   No history of anesthetic complications            Review of Systems / Medical History  Patient summary reviewed, nursing notes reviewed and pertinent labs reviewed    Pulmonary    COPD    Sleep apnea: CPAP           Neuro/Psych   Within defined limits           Cardiovascular    Hypertension          Past MI, CAD and cardiac stents         GI/Hepatic/Renal     GERD      Liver disease     Endo/Other    Diabetes    Arthritis     Other Findings              Physical Exam    Airway  Mallampati: II  TM Distance: > 6 cm  Neck ROM: normal range of motion   Mouth opening: Normal     Cardiovascular  Regular rate and rhythm,  S1 and S2 normal,  no murmur, click, rub, or gallop             Dental  No notable dental hx       Pulmonary  Breath sounds clear to auscultation               Abdominal  GI exam deferred       Other Findings            Anesthetic Plan    ASA: 3  Anesthesia type: MAC            Anesthetic plan and risks discussed with: Patient

## 2020-01-23 ENCOUNTER — ANESTHESIA (OUTPATIENT)
Dept: ENDOSCOPY | Age: 65
End: 2020-01-23
Payer: MEDICARE

## 2020-01-23 ENCOUNTER — HOSPITAL ENCOUNTER (OUTPATIENT)
Age: 65
Setting detail: OUTPATIENT SURGERY
Discharge: HOME | End: 2020-01-23
Attending: INTERNAL MEDICINE | Admitting: INTERNAL MEDICINE
Payer: MEDICARE

## 2020-01-23 VITALS
DIASTOLIC BLOOD PRESSURE: 75 MMHG | HEIGHT: 70 IN | BODY MASS INDEX: 32.64 KG/M2 | OXYGEN SATURATION: 95 % | SYSTOLIC BLOOD PRESSURE: 124 MMHG | RESPIRATION RATE: 19 BRPM | HEART RATE: 60 BPM | WEIGHT: 228 LBS | TEMPERATURE: 98.5 F

## 2020-01-23 PROCEDURE — 77030014243 HC BND LIG VRCES BSC -D: Performed by: INTERNAL MEDICINE

## 2020-01-23 PROCEDURE — 76060000032 HC ANESTHESIA 0.5 TO 1 HR: Performed by: INTERNAL MEDICINE

## 2020-01-23 PROCEDURE — 74011250636 HC RX REV CODE- 250/636: Performed by: NURSE ANESTHETIST, CERTIFIED REGISTERED

## 2020-01-23 PROCEDURE — 74011000258 HC RX REV CODE- 258: Performed by: INTERNAL MEDICINE

## 2020-01-23 PROCEDURE — 74011000250 HC RX REV CODE- 250: Performed by: NURSE ANESTHETIST, CERTIFIED REGISTERED

## 2020-01-23 PROCEDURE — 76040000007: Performed by: INTERNAL MEDICINE

## 2020-01-23 RX ORDER — PROPOFOL 10 MG/ML
INJECTION, EMULSION INTRAVENOUS AS NEEDED
Status: DISCONTINUED | OUTPATIENT
Start: 2020-01-23 | End: 2020-01-23 | Stop reason: HOSPADM

## 2020-01-23 RX ORDER — LIDOCAINE HYDROCHLORIDE 20 MG/ML
INJECTION, SOLUTION EPIDURAL; INFILTRATION; INTRACAUDAL; PERINEURAL AS NEEDED
Status: DISCONTINUED | OUTPATIENT
Start: 2020-01-23 | End: 2020-01-23 | Stop reason: HOSPADM

## 2020-01-23 RX ORDER — SODIUM CHLORIDE 9 MG/ML
INJECTION, SOLUTION INTRAVENOUS
Status: DISCONTINUED | OUTPATIENT
Start: 2020-01-23 | End: 2020-01-23 | Stop reason: HOSPADM

## 2020-01-23 RX ORDER — SODIUM CHLORIDE 0.9 % (FLUSH) 0.9 %
5-40 SYRINGE (ML) INJECTION AS NEEDED
Status: DISCONTINUED | OUTPATIENT
Start: 2020-01-23 | End: 2020-01-23 | Stop reason: HOSPADM

## 2020-01-23 RX ORDER — DEXTROMETHORPHAN/PSEUDOEPHED 2.5-7.5/.8
1.2 DROPS ORAL
Status: DISCONTINUED | OUTPATIENT
Start: 2020-01-23 | End: 2020-01-23 | Stop reason: HOSPADM

## 2020-01-23 RX ORDER — SODIUM CHLORIDE 0.9 % (FLUSH) 0.9 %
5-40 SYRINGE (ML) INJECTION EVERY 8 HOURS
Status: DISCONTINUED | OUTPATIENT
Start: 2020-01-23 | End: 2020-01-23 | Stop reason: HOSPADM

## 2020-01-23 RX ORDER — MIDAZOLAM HYDROCHLORIDE 1 MG/ML
5-10 INJECTION, SOLUTION INTRAMUSCULAR; INTRAVENOUS
Status: DISCONTINUED | OUTPATIENT
Start: 2020-01-23 | End: 2020-01-23 | Stop reason: HOSPADM

## 2020-01-23 RX ORDER — EPINEPHRINE 0.1 MG/ML
1 INJECTION INTRACARDIAC; INTRAVENOUS
Status: DISCONTINUED | OUTPATIENT
Start: 2020-01-23 | End: 2020-01-23 | Stop reason: HOSPADM

## 2020-01-23 RX ORDER — FLUMAZENIL 0.1 MG/ML
0.2 INJECTION INTRAVENOUS
Status: DISCONTINUED | OUTPATIENT
Start: 2020-01-23 | End: 2020-01-23 | Stop reason: HOSPADM

## 2020-01-23 RX ORDER — SODIUM CHLORIDE 9 MG/ML
50 INJECTION, SOLUTION INTRAVENOUS CONTINUOUS
Status: DISCONTINUED | OUTPATIENT
Start: 2020-01-23 | End: 2020-01-23 | Stop reason: HOSPADM

## 2020-01-23 RX ORDER — ATROPINE SULFATE 0.1 MG/ML
0.5 INJECTION INTRAVENOUS
Status: DISCONTINUED | OUTPATIENT
Start: 2020-01-23 | End: 2020-01-23 | Stop reason: HOSPADM

## 2020-01-23 RX ORDER — FENTANYL CITRATE 50 UG/ML
50-200 INJECTION, SOLUTION INTRAMUSCULAR; INTRAVENOUS
Status: DISCONTINUED | OUTPATIENT
Start: 2020-01-23 | End: 2020-01-23 | Stop reason: HOSPADM

## 2020-01-23 RX ORDER — NALOXONE HYDROCHLORIDE 0.4 MG/ML
0.4 INJECTION, SOLUTION INTRAMUSCULAR; INTRAVENOUS; SUBCUTANEOUS
Status: DISCONTINUED | OUTPATIENT
Start: 2020-01-23 | End: 2020-01-23 | Stop reason: HOSPADM

## 2020-01-23 RX ADMIN — SODIUM CHLORIDE: 900 INJECTION, SOLUTION INTRAVENOUS at 07:16

## 2020-01-23 RX ADMIN — PROPOFOL 100 MG: 10 INJECTION, EMULSION INTRAVENOUS at 08:01

## 2020-01-23 RX ADMIN — PROPOFOL 50 MG: 10 INJECTION, EMULSION INTRAVENOUS at 07:56

## 2020-01-23 RX ADMIN — PROPOFOL 50 MG: 10 INJECTION, EMULSION INTRAVENOUS at 07:57

## 2020-01-23 RX ADMIN — LIDOCAINE HYDROCHLORIDE 100 MG: 20 INJECTION, SOLUTION EPIDURAL; INFILTRATION; INTRACAUDAL; PERINEURAL at 07:51

## 2020-01-23 RX ADMIN — PROPOFOL 150 MG: 10 INJECTION, EMULSION INTRAVENOUS at 07:51

## 2020-01-23 NOTE — DISCHARGE INSTRUCTIONS
[facility], Karine POOL, Piedad Mauro MD Levan Libel, NANCY Briseno, [facility]-BC     Lauren Jaffe, [facility]   LANA Quinones, [facility]       Demetria Lowe Terr[address] 136    at [address], [facility] Shabana Loomis  22.    [phone]    FAX: [address] [place] [address], [address]    [phone]    FAX: [phone]         ENDOSCOPY WITH BANDING DISCHARGE INSTRUCTIONS    Toan Gould  1955  Date: 1/23/2020    DISCOMFORT:  Use lozenges or warm salt water gargle for sore thoat  Apply warm compress to IV site if red. If redness or soreness persists call the office. You may experience gas and bloating. Walking and belching will help relieve this. You may experience chest pain or discomfort or feel as though food is \"sticking\" in your food pipe for a few days after the procedure. This is a normal feeling after banding of esophageal varices. CHEST PRESSURE:  Banding of dilated veins (varices) in the food tube (esophagus) can be painful in some persons. The pain is caused by squeezing the varices and lining of the esophagus by the bands used to seal the varices. You can take liquid pain medication either acetaminophen or alternative. The best treatment for this is to drink a an \"Icee\" or \"Slurpee\" since the ice crystals will freeze the nerve endings in the food tube and relieve the pain. DIET:  Regular food may dislodge the bands placed on the varices. For this reason you should only have liquid for the rest of today. Eat only soft food that does not need to be chewed all day tomorrow. You may advance to your regular diet in 2 days.     ACTIVITY:  Spend the remainder of the day resting. Avoid any strenuous activity. You may not operate a vehicle for 12 hours. You may not engage in an occupation involving machinery or appliances for rest of today. Avoid making any critical decisions for at least 24 hour. Call the [facility] [address] if you have any of the following:  Increasing chest or abdominal pain, nausea, vomiting, vomiting blood, abdominal distension or swelling, fever or chills, bloody discharge from nose or mouth or shortness of breath. Follow-up Instructions:  Call Dr. Cathy Guerra for any questions or problems at the phone number listed above. If a biopsy was performed, you will be contacted by the office staff or Dr Cathy Guerra within 1 week. If you have not heard from us by then you may call the office at the phone number listed above to inquire about the results. ENDOSCOPY FINDINGS:  A single varix was found in the esophagus (food tube). 1 band was placed to seal the varices and reduce the risk of bleeding. Will repeat EGD to evaluate for additional varices and need for more banding in 6 months. DISCHARGE SUMMARY from the Nurse:   The following personal items collected during your admission are returned to you:   Dental Appliance: Dental Appliances: None  Vision: Visual Aid: Glasses  Hearing Aid:    Jewelry:    Clothing:    Other Valuables:    Valuables sent to safe:

## 2020-01-23 NOTE — ANESTHESIA POSTPROCEDURE EVALUATION
Post-Anesthesia Evaluation and Assessment    Patient: Kelsie Patel MRN: [id number]  SSN: [id number]    YOB: 1955  Age: 71 y.o. Sex: male      I have evaluated the patient and they are stable and ready for discharge from the PACU. Cardiovascular Function/Vital Signs  Visit Vitals  /67   Pulse 67   Temp 36.9 °C (98.5 °F)   Resp 14   Ht 5' 10\" (1.778 m)   Wt 103.4 kg (228 lb)   SpO2 93%   BMI 32.71 kg/m²       Patient is status post MAC anesthesia for Procedure(s):  ESOPHAGOGASTRODUODENOSCOPY (EGD)  ENDOSCOPIC BANDING OR LIGATION. Nausea/Vomiting: None    Postoperative hydration reviewed and adequate. Pain:  Pain Scale 1: Visual (01/23/20 0815)  Pain Intensity 1: 0 (01/23/20 0815)   Managed    Neurological Status: At baseline    Mental Status, Level of Consciousness: Alert and  oriented to person, place, and time    Pulmonary Status:   O2 Device: Room air (01/23/20 0815)   Adequate oxygenation and airway patent    Complications related to anesthesia: None    Post-anesthesia assessment completed. No concerns    Signed By: Alcon Good MD     January 23, 2020              Procedure(s):  ESOPHAGOGASTRODUODENOSCOPY (EGD)  ENDOSCOPIC BANDING OR LIGATION. MAC    <BSHSIANPOST>    Vitals Value Taken Time   BP 0/0 1/23/2020  8:21 AM   Temp     Pulse 65 1/23/2020  8:24 AM   Resp 17 1/23/2020  8:24 AM   SpO2 97 % 1/23/2020  8:24 AM   Vitals shown include unvalidated device data.

## 2020-01-23 NOTE — H&P
[facility], Zaira POOL Cite Mongi Slim, Normand Leriche, MD Ronda Estrin, PABENNY Castro, [facility]-BC     Lauren Jaffe, [facility]   Khalif Alvarado FNP-EDWIN Amaya, [facility]       Demetria Lowe [facility] De Rodriguez 136    at [address]    [address] [place] [address], [facility] Shabana Loomis  22.    [phone]    FAX: [address] [place] [address] Avenue    [address], [address]    [phone]    FAX: [phone]         PRE-PROCEDURE NOTE - EGD    H and P from last office visit reviewed. Allergies reviewed. Out-patient medicaton list reviewed. Patient Active Problem List   Diagnosis Code    Diastolic CHF, acute on chronic (HCC) I50.33    CAD (coronary artery disease) I25.10    Chronic airway obstruction, not elsewhere classified J44.9    HTN (hypertension), benign I10    Anemia, unspecified D64.9    Obstructive sleep apnea (adult) (pediatric) G47.33    Chronic blood loss anemia D50.0    NAFLD (nonalcoholic fatty liver disease) K76.0    Type II diabetes mellitus (HCC) E11.9    Cirrhosis of liver without ascites (HCC) K74.60       Allergies   Allergen Reactions    Ciprofloxacin Hives    Ezetimibe Other (comments)     Muscle pain.  Penicillins Rash    Pravastatin Other (comments)     Muscle pain.  Simvastatin Other (comments)     Muscle pain. No current facility-administered medications on file prior to encounter. Current Outpatient Medications on File Prior to Encounter   Medication Sig Dispense Refill    atorvastatin (LIPITOR) 20 mg tablet Take 20 mg by mouth daily.  umeclidinium-vilanterol (ANORO ELLIPTA) 62.5-25 mcg/actuation inhaler Take 1 Puff by inhalation daily.  aspirin 81 mg chewable tablet Take 81 mg by mouth daily.       albuterol (PROAIR HFA) 90 mcg/actuation inhaler Take 2 Puffs by inhalation every four (4) hours as needed.  ergocalciferol (VITAMIN D2) 50,000 unit capsule Take 50,000 Units by mouth every seven (7) days.  lansoprazole (PREVACID PO) Take  by mouth. OTC. Takes 2 po in am.      diltiazem (TIAZAC) 180 mg SR capsule Take 180 mg by mouth daily.  losartan (COZAAR) 50 mg tablet Take 50 mg by mouth daily.  metoprolol-XL (TOPROL-XL) 50 mg XL tablet Take 50 mg by mouth daily.  montelukast (SINGULAIR) 10 mg tablet Take 10 mg by mouth daily.  albuterol (VENTOLIN HFA) 90 mcg/actuation inhaler Take 2 Puffs by inhalation every four (4) hours as needed.  exenatide microspheres (BYDUREON SC) 2 Units by SubCUTAneous route every seven (7) days.  insulin glargine,hum.rec.anlog (TOUJEO SOLOSTAR U-300 INSULIN SC) by SubCUTAneous route. 26-36 units subcutaneous at night depending on BS.  insulin lispro (HUMALOG KWIKPEN INSULIN SC) 3-7 Units by SubCUTAneous route three (3) times daily.  TRUE METRIX GLUCOSE TEST STRIP strip test blood sugar 3 TO FOUR TIMES daily  99    PEN NEEDLE 31 gauge x 3/16\" ndle USE TO GIVE INJECTIONS FOUR TIMES DAILY  3    fluticasone furoate (ARNUITY ELLIPTA) 100 mcg/actuation dsdv inhaler Take 1 Puff by inhalation daily.  guaiFENesin (ORGANIDIN) 400 mg tablet Take 400 mg by mouth daily.  nitroglycerin (NITROSTAT) 0.4 mg SL tablet by SubLINGual route every five (5) minutes as needed. For EGD to assess for esophageal and gastric varices. Plan to perform banding if indicated based upon variceal size and appearance. The risks of the procedure were discussed with the patient. These included reaction to anesthesia, pain, perforation and bleeding. All questions were answered. The patient wishes to proceed with the procedure. PHYSICAL EXAMINATION:  VS: per nursing note  General: No acute distress. Eyes: Sclera anicteric.    ENT: No oral lesions. Thyroid normal.  Nodes: No adenopathy. Skin: No spider angiomata. No jaundice. No palmar erythema. Respiratory: Lungs clear to auscultation. Cardiovascular: Regular heart rate. No murmurs. No JVD. Abdomen: Soft non-tender, liver size normal to percussion/palpation. Spleen not palpable. No obvious ascites. Extremities: No edema. No muscle wasting. No gross arthritic changes. Neurologic: Alert and oriented. Cranial nerves grossly intact. No asterixis. MOST RECENT LABORATORY STUDIES:  Lab Results   Component Value Date/Time    WBC 9.8 09/04/2019 12:00 AM    Hemoglobin (POC) 13.3 12/05/2012 12:23 PM    HGB 14.3 09/04/2019 12:00 AM    Hematocrit (POC) 39 12/05/2012 12:23 PM    HCT 42.8 09/04/2019 12:00 AM    PLATELET 092 (L) 25/12/4255 12:00 AM    MCV 92 09/04/2019 12:00 AM     Lab Results   Component Value Date/Time    INR 1.1 09/04/2019 12:00 AM    INR 1.0 04/12/2019 08:39 AM    INR 1.0 06/15/2018 08:59 AM    Prothrombin time 11.2 09/04/2019 12:00 AM    Prothrombin time 10.7 04/12/2019 08:39 AM    Prothrombin time 10.9 06/15/2018 08:59 AM       ASSESSMENT AND PLAN:  EGD to assess for esophageal and/or gastric varices.   Sedation per anesthesiology      MD Monica Turner 13  [address] [place] [address] A, [address] [place] [address].  [phone]  [address] [place] [address]

## 2020-01-23 NOTE — ROUTINE PROCESS
Jossy Jones  1955  [id number]    Situation:  Verbal report received from: CHELSEA Singleton  Procedure: Procedure(s):  ESOPHAGOGASTRODUODENOSCOPY (EGD)  ENDOSCOPIC BANDING OR LIGATION    Background:    Preoperative diagnosis: CIRRHOSIS, ALFONSO  Postoperative diagnosis: 1. Portal Gastropathy  2. Varices with bands x 1    :  Dr. Kairs Salomon  Assistant(s): Endoscopy Technician-1: Rito Ramos  Endoscopy RN-1: Winter Anthony  Endoscopy RN-2: London Villalobos RN    Specimens: * No specimens in log *  H. Pylori  no    Assessment:  Intra-procedure medications       Anesthesia gave intra-procedure sedation and medications, see anesthesia flow sheet yes    Intravenous fluids:   400  NS  @ KVO     Vital signs stable yes    Abdominal assessment: round and soft yes    Recommendation:  Discharge patient per MD order yes.   Return to floor no  Family or Friend : wife  Permission to share finding with family or friend yes

## 2020-01-23 NOTE — PROCEDURES
[facility] Il[facility] Fail, Eileen Mauro MD Marveen Doffing, PABENNY Golden, ACN-BC     Lauren Jaffe, [facility]   Mary Kenny, FNP-C    Oneyda Rasheed, [facility]       Deemtria Lowe [facility] De Ordriguez 136    at [address], [address] [place] [address], [facility] 22.    [phone]    FAX: [address] [place] [address], [address] [facility] [address]    [phone]    FAX: [phone]         UPPER ENDOSCOPY PROCEDURE NOTE    Alverto Krishnamurthyssner  1955    INDICATION: Cirrhosis. Screening for esophageal varices with variceal ligation if indicated. : Dain Villafana MD    SURGICAL ASSISTANT:  None    PROSTHETIC DEVISES, TISSUE GRAFTS, ORGAN TRANSPLANTS:  Not applicable     ANESTHESIA/SEDATION: Propofol was administered by anesthesia      PROCEDURE DESCRIPTION:  [employer] consent was obtained from the patient for the procedure. All risks and benefits of the procedure explained. The patient was taken to the endoscopy suite and placed in the left lateral decubitus position. Following sequential administration of sedation to doses as indicated above the endoscope was inserted into the mouth and advanced under direct vision to the second portion of the duodenum. Careful inspection of upper gastrointestinal tract was made as the endoscope was inserted and withdrawn. Retroflexion of the endoscope to view of the cardia of the stomach was performed. After withdrawing the endoscope the banding devise was placed on the tip of the endoscope. The scope was then reinserted under direct inspection and advanced to the esophagus. Banding of esophageal varices was performed as described below. The scope was then removed.     FINDINGS:  Esophagus:    A single medium esophageal varix was identified. Banding of esophageal varices was performed. Excellent hemostasis was achieved after banding. Stomach: Moderate portal hypertensive gastropathy of the body of the stomach  No gastric varices identified. Duodenum:   Normal bulb and second portion    INTERVENTION:   1 band placed were placed on esophageal varices. COMPLICATIONS: None. The patient tolerated the procedure well. EBL: Negligible. RECOMMENDATIONS:  Observe until discharge parameters are achieved. Liquid diet today. Soft food tomorrow. Resume general diet thereafter. Repeat endoscopy to reassess varices and need for additional banding in 6 months. Follow-up [facility] office as scheduled.       Yordy Oropeza MD  [facility] of [address], [address] [place] [address] 22.  [phone]  [address] [place] [address]

## 2020-01-23 NOTE — PERIOP NOTES

## 2020-03-20 ENCOUNTER — DOCUMENTATION ONLY (OUTPATIENT)
Dept: HEMATOLOGY | Age: 65
End: 2020-03-20

## 2020-07-15 ENCOUNTER — OFFICE VISIT (OUTPATIENT)
Dept: HEMATOLOGY | Age: 65
End: 2020-07-15

## 2020-07-15 VITALS
TEMPERATURE: 97.5 F | BODY MASS INDEX: 32.64 KG/M2 | OXYGEN SATURATION: 96 % | SYSTOLIC BLOOD PRESSURE: 144 MMHG | RESPIRATION RATE: 18 BRPM | DIASTOLIC BLOOD PRESSURE: 69 MMHG | HEART RATE: 70 BPM | HEIGHT: 70 IN | WEIGHT: 228 LBS

## 2020-07-15 DIAGNOSIS — K74.60 CIRRHOSIS OF LIVER WITHOUT ASCITES, UNSPECIFIED HEPATIC CIRRHOSIS TYPE (HCC): Primary | ICD-10-CM

## 2020-07-15 RX ORDER — CIPROFLOXACIN 500 MG/1
TABLET ORAL
Status: ON HOLD | COMMUNITY
Start: 2020-07-03 | End: 2020-10-22

## 2020-07-15 RX ORDER — GABAPENTIN 100 MG/1
CAPSULE ORAL
COMMUNITY
Start: 2020-06-25

## 2020-07-15 RX ORDER — MELOXICAM 15 MG/1
TABLET ORAL
COMMUNITY
Start: 2020-06-18

## 2020-07-15 NOTE — PROGRESS NOTES
[address] [place] [address], MD, MD Todd Ward PA-C Josue Horner, [facility]     April S Alannah, [facility]   Peterson Yang FNP-EDWIN Hale, [facility]       Demetria Nelson 136    at [address], [address] [place] [address], [place] [address].    [phone]    FAX: [address] [facility] [address]    [address], [address] [facility] [address]    [phone]    FAX: [phone]     Patient Care Team:  Alejandra Smith MD as PCP - General (Geriatric Medicine)  Giana Dimas MD (Orthopedic Surgery)      Problem List  Date Reviewed: 9/4/2019          Codes Class Noted    Cirrhosis of liver without ascites [facility]) ICD-10-CM: K74.60  ICD-9-CM: 571.5  6/15/2018        Type II diabetes mellitus ([facility] ICD-10-CM: E11.9  ICD-9-CM: 250.00  5/17/2018        NAFLD (nonalcoholic fatty liver disease) ICD-10-CM: K76.0  ICD-9-CM: 571.8  2/16/2018        Chronic blood loss anemia ICD-10-CM: D50.0  ICD-9-CM: 280.0  1/12/2013        Chronic airway obstruction, not elsewhere classified ICD-10-CM: J44.9  ICD-9-CM: 339  1/11/2013        HTN (hypertension), benign ICD-10-CM: I10  ICD-9-CM: 401.1  1/11/2013        Anemia, unspecified ICD-10-CM: D64.9  ICD-9-CM: 285.9  1/11/2013        Obstructive sleep apnea (adult) (pediatric) ICD-10-CM: G47.33  ICD-9-CM: 327.23  3/57/0004        Diastolic CHF, acute on chronic [facility]) ICD-10-CM: I50.33  ICD-9-CM: 428.33, 428.0  8/23/2012        CAD (coronary artery disease) ICD-10-CM: I25.10  ICD-9-CM: 414.00  8/23/2012            Autumn caballero to the [address] for management of 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   The active problem list, all pertinent past medical history, medications, radiologic findings and laboratory findings related to the liver disorder were reviewed with the patient. The patient is a 76 y.o.  male who was found to have elevated liver enzymes in 2017 and is suspected to have fatty liver disease. Serologic evaluation for causes of chronic liver disease were negative. Assessment of liver fibrosis was performed in the past with Fibroscan. The result was 31.2 kPa which correlates with cirrhosis. He has since had liver biopsy without complication which confirmed 10-25% steatosis and cirrhosis. Ultrasound of the liver was last performed in 9/2019. The results of the imaging suggested cirrhotic liver morphology without focal liver mass. He is overdue for repeat. He has continued to undergo EGD for screening of varices and these have been banded on past EGD in 7/2019. Last EGD in 1/2020 showed continued moderate portal hypertensive changes and 1 band was placed. Recommendations for repeat in 6 months. No evidence of bleeding. The most recent laboratory studies indicate that the liver transaminases are elevated, ALP is normal, tests of hepatic synthetic and metabolic function are normal, and the platelet count is normal.    During the month of March 2020, he reports that he was eating more and not managing sugars effectively, often running in the 300+ range. He has had issues with infection following this time having had a pilonidal cyst that needed draining and packing and a foot ulcer that has taken months to correct, he is just finishing his last course of antibiotics. He reports that he is getting his sugars under better control and has lost 4-5 # recently. He has also had some increased shoulder and neck issues and has been going to PT for assistance. The patient has no symptoms which could be attributed to the liver disorder. The patient completes all daily activities without any functional limitations.   The patient has not experienced pain in the right side over the liver. He has completed recent screening testing for complications of cirrhosis and has had no new issues or concerns. ALLERGIES  Allergies   Allergen Reactions    Ciprofloxacin Hives     Pt states he is not allergic to this.  Ezetimibe Other (comments)     Muscle pain.  Penicillins Rash    Pravastatin Other (comments)     Muscle pain.  Simvastatin Other (comments)     Muscle pain. MEDICATIONS  Current Outpatient Medications   Medication Sig    gabapentin (NEURONTIN) 100 mg capsule TAKE ONE CAPSULE BY MOUTH TWICE DAILY    meloxicam (MOBIC) 15 mg tablet TAKE ONE TABLET BY MOUTH EVERY DAY    ciprofloxacin HCl (CIPRO) 500 mg tablet TAKE ONE TABLET BY MOUTH every 12 hours    atorvastatin (LIPITOR) 20 mg tablet Take 20 mg by mouth daily.  umeclidinium-vilanterol (ANORO ELLIPTA) 62.5-25 mcg/actuation inhaler Take 1 Puff by inhalation daily.  aspirin 81 mg chewable tablet Take 81 mg by mouth daily.  insulin glargine,hum.rec.anlog (TOUJEO SOLOSTAR U-300 INSULIN SC) by SubCUTAneous route. 26-36 units subcutaneous at night depending on BS.  insulin lispro (HUMALOG KWIKPEN INSULIN SC) 3-14 Units by SubCUTAneous route three (3) times daily.  TRUE METRIX GLUCOSE TEST STRIP strip test blood sugar 3 TO FOUR TIMES daily    PEN NEEDLE 31 gauge x 3/16\" ndle USE TO GIVE INJECTIONS FOUR TIMES DAILY    albuterol (PROAIR HFA) 90 mcg/actuation inhaler Take 2 Puffs by inhalation every four (4) hours as needed.  ergocalciferol (VITAMIN D2) 50,000 unit capsule Take 50,000 Units by mouth every seven (7) days.  lansoprazole (PREVACID PO) Take  by mouth. OTC. Takes 2 po in am.    fluticasone furoate (ARNUITY ELLIPTA) 100 mcg/actuation dsdv inhaler Take 1 Puff by inhalation daily.  diltiazem (TIAZAC) 180 mg SR capsule Take 180 mg by mouth daily.  guaiFENesin (ORGANIDIN) 400 mg tablet Take 400 mg by mouth daily.     losartan (COZAAR) 50 mg tablet Take 50 mg by mouth daily.  metoprolol-XL (TOPROL-XL) 50 mg XL tablet Take 50 mg by mouth daily.  nitroglycerin (NITROSTAT) 0.4 mg SL tablet by SubLINGual route every five (5) minutes as needed.  montelukast (SINGULAIR) 10 mg tablet Take 10 mg by mouth daily.  exenatide microspheres (BYDUREON SC) 2 Units by SubCUTAneous route every seven (7) days.  albuterol (VENTOLIN HFA) 90 mcg/actuation inhaler Take 2 Puffs by inhalation every four (4) hours as needed. No current facility-administered medications for this visit. SYSTEM REVIEW NOT RELATED TO LIVER DISEASE OR REVIEWED ABOVE:  Constitution systems: Negative for fever, chills, weight gain. Weight stable over the course of the past 12 months (variably up and down). Noted increased fatigue. Eyes: Negative for visual changes. ENT: Negative for sore throat, painful swallowing. Respiratory: Negative for cough, hemoptysis, SOB. Cardiology: Negative for chest pain, palpitations. GI:  Negative for constipation or diarrhea. : Negative for urinary frequency, dysuria, hematuria, nocturia. Skin: Negative for rash. Hematology: Negative for easy bruising, blood clots. Musculo-skeletal: Negative for back pain, muscle pain, weakness. Neurologic: Negative for headaches, dizziness, vertigo, memory problems not related to HE. Psychology: Negative for anxiety, depression. FAMILY HISTORY:  The father  of dementia. The mother has the following chronic diseases: dementia. There is no family history of liver disease. SOCIAL HISTORY:  The patient is . The patient has 2 children, and 3 grandchildren. The patient stopped using tobacco products in 10/1998. The patient has previously consumed alcohol socially never in excess. The patient has been abstinent from alcohol since 2015. The patient used to work as a muscician.       PHYSICAL EXAMINATION:  Visit Vitals  /69 (BP 1 Location: Left arm, BP Patient Position: Sitting)   Pulse 70   Temp 97.5 °F (36.4 °C) (Temporal)   Resp 18   Ht 5' 10\" (1.778 m)   Wt 228 lb (103.4 kg)   SpO2 96%   BMI 32.71 kg/m²     General: No acute distress. Eyes: Sclera anicteric. ENT: No oral lesions. Thyroid normal.  Nodes: No adenopathy. Skin: No spider angiomata. No jaundice. Positive for palmar erythema. Respiratory: Lungs clear to auscultation. Cardiovascular: Regular heart rate. No murmurs. No JVD. Abdomen: Soft non-tender. Liver edge firm as easily palpable 2-3 cm BCM. Spleen not palpable. No obvious ascites. Extremities: No edema. No muscle wasting. No gross arthritic changes. Neurologic: Alert and oriented. Cranial nerves grossly intact. No asterixis.     LABORATORY STUDIES:  Liver [place] of [id number] Units 9/4/2019 4/12/2019 6/15/2018   WBC 3.4 - 10.8 x10E3/uL 9.8 10.1 10.0   ANC 1.4 - 7.0 x10E3/uL      HGB 13.0 - 17.7 g/dL 14.3 14.5 15.1    - 450 x10E3/uL 131 (L) 141 (L) 181   INR 0.8 - 1.2 1.1 1.0 1.0   AST 0 - 40 IU/L 51 (H) 31 37   ALT 0 - 44 IU/L 56 (H) 42 37   Alk Phos 39 - 117 IU/L 111 109 123 (H)   Bili, Total 0.0 - 1.2 mg/dL <0.2 0.2 0.3   Bili, Direct 0.00 - 0.40 mg/dL 0.10 0.10 0.11   Albumin 3.6 - 4.8 g/dL 3.9 4.4 4.2   BUN 8 - 27 mg/dL 15 17 17   Creat 0.76 - 1.27 mg/dL 0.97 0.96 1.02   Na 134 - 144 mmol/L 136 137 137   K 3.5 - 5.2 mmol/L 4.4 4.7 4.4   Cl 96 - 106 mmol/L 99 100 99   CO2 20 - 29 mmol/L 24 25 23   Glucose 65 - 99 mg/dL 144 (H) 133 (H) 72     Cancer Screening Latest Ref Rng & Units 9/4/2019 4/12/2019 6/15/2018   AFP, Serum 0.0 - 8.0 ng/mL 2.2 2.5 2.3   AFP-L3% 0.0 - 9.9 % Comment Comment Comment     SEROLOGIES:  Serologies Latest Ref Rng & Units 2/16/2018   Hep A Ab, Total Negative Negative   Hep B Surface Ag Negative Negative   Hep B Core Ab, Total Negative Negative   Hep B Surface AB QL  Non Reactive   Hep C Ab 0.0 - 0.9 s/co ratio <0.1   Ferritin 30 - 400 ng/mL 31   Iron % Saturation 15 - 55 % 22   LAUREN, IFA  Negative   ASMCA 0 - 19 Units 11   Alpha-1 antitrypsin level 90 - 200 mg/dL 170     LIVER HISTOLOGY:  3/2018. FibroScan performed at [address]. EkPa was 31.2. IQR/med 56%. . Consistent with ALFONSO and cirrhosis. ENDOSCOPIC PROCEDURES:  1/2013. Colonoscopy performed by Dr Otis Adan. No polyps. 1/2013. EGD performed by Dr Otis Adan. No varices, gastric erythema. 4/2018. Colonoscopy performed by Dr Otis Adan. Reportedly normal per patient. 12/2018. EGD performed by Dr Silvia Logan. Small esophageal varices. No banding performed. No gastric varices. Mild portal gastropathy. Gastritis. Repeat in 6 months.   7/2019. EGD performed by Dr Silvia Logan. Medium esophageal varices. Banding performed x 4. No gastric varices. Mild portal gastropathy. Repeat in 6 months.  1/2020. EGD performed by Dr Silvia Logan. Medium esophageal varix, banding performed x 1. No gastric varices. Moderate portal gastropathy. Repeat in 6 months. RADIOLOGY:  12/2017. Ultrasound of liver. Normal appearing liver. No liver mass lesions. 1/2018. Ultrasound of liver. Cirrhotic liver morphology without focal liver mass. No ascites. Status post cholecystectomy. 1/2019. Ultrasound of liver. Echogenic consistent with cirrhosis. No liver mass lesions. No dilated bile ducts. No ascites. 9/2019. Ultrasound of liver. Echogenic consistent with cirrhosis. No liver mass lesions. No dilated bile ducts. No ascites. Borderline splenomegaly. OTHER TESTING:  Not available or performed    ASSESSMENT AND PLAN:  Suspected ALFONSO with cirrhosis. Liver transaminases are variably elevated. Liver function is normal.  The platelet count is normal.      I have reviewed in detail with the patient the natural history and recommended follow-up of ALFONSO cirrhosis. This will include surveillance for [address] and varices as well as monitoring for fluid overload and liver function.       [address] screening. Will continue to follow AFP and will repeat US of the liver every 6 months, this is now overdue, I have given the patient the number to call to get this scheduled. EGD to evaluate for esophageal varices and need to administer treatment to reduce risk of first variceal bleed was recently performed and shows medium varices, banding performed x 1. Repeat in 7/2020 was recommended, I will place additional order. The patient was counseled regarding diet and exercise to achieve weight loss. He is carefully following calories and activity levels, with a goal of 200#. We have discussed means to target specific changes to support weight loss goals and better management of diet and glucose levels. He understands how important this is given his recent infection complications. The only medical treatments for ALFONSO are though clinical trials. The patient was offered participation in a clinical trial for treatment of ALFONSO. The patient would like to defer participation in a clinical trial at this time due to his schedule of travel. The patient was directed to continue all current medications at the current dosages. There are no contraindications for the patient to take any medications that are necessary for treatment of other medical issues including medications for diabetes mellitus and hypercholesterolemia. The patient was counseled regarding alcohol consumption and that this could contribute to fatty liver disease. Vaccination for viral hepatitis A and B is recommended since the patient has no serologic evidence of previous exposure or vaccination with immunity. All of the above issues were discussed with the patient. All questions were answered. The patient expressed a clear understanding of the above. [facility] 87 in 6 months virtual with ultrasound and EGD in the meantime.      Mulu Portillo PA-C  Liver [place] [address], [address] [facility] 22.  [address] [facility]

## 2020-10-22 ENCOUNTER — HOSPITAL ENCOUNTER (OUTPATIENT)
Age: 65
Setting detail: OUTPATIENT SURGERY
Discharge: HOME | End: 2020-10-22
Attending: INTERNAL MEDICINE | Admitting: INTERNAL MEDICINE
Payer: MEDICARE

## 2020-10-22 ENCOUNTER — ANESTHESIA (OUTPATIENT)
Dept: ENDOSCOPY | Age: 65
End: 2020-10-22
Payer: MEDICARE

## 2020-10-22 ENCOUNTER — ANESTHESIA EVENT (OUTPATIENT)
Dept: ENDOSCOPY | Age: 65
End: 2020-10-22
Payer: MEDICARE

## 2020-10-22 VITALS
DIASTOLIC BLOOD PRESSURE: 72 MMHG | BODY MASS INDEX: 32.78 KG/M2 | HEIGHT: 70 IN | WEIGHT: 229 LBS | OXYGEN SATURATION: 97 % | HEART RATE: 62 BPM | SYSTOLIC BLOOD PRESSURE: 127 MMHG | TEMPERATURE: 98.1 F | RESPIRATION RATE: 18 BRPM

## 2020-10-22 DIAGNOSIS — Z00.00 NORMAL EXAM: ICD-10-CM

## 2020-10-22 DIAGNOSIS — K76.6 PORTAL HYPERTENSION (HCC): ICD-10-CM

## 2020-10-22 LAB
GLUCOSE BLD STRIP.AUTO-MCNC: 124 MG/DL (ref 65–100)
SERVICE CMNT-IMP: ABNORMAL

## 2020-10-22 PROCEDURE — 74011250636 HC RX REV CODE- 250/636: Performed by: NURSE ANESTHETIST, CERTIFIED REGISTERED

## 2020-10-22 PROCEDURE — 43235 EGD DIAGNOSTIC BRUSH WASH: CPT | Performed by: INTERNAL MEDICINE

## 2020-10-22 PROCEDURE — 2709999900 HC NON-CHARGEABLE SUPPLY: Performed by: INTERNAL MEDICINE

## 2020-10-22 PROCEDURE — 74011000250 HC RX REV CODE- 250: Performed by: NURSE ANESTHETIST, CERTIFIED REGISTERED

## 2020-10-22 PROCEDURE — 76060000031 HC ANESTHESIA FIRST 0.5 HR: Performed by: INTERNAL MEDICINE

## 2020-10-22 PROCEDURE — 76040000019: Performed by: INTERNAL MEDICINE

## 2020-10-22 PROCEDURE — 82962 GLUCOSE BLOOD TEST: CPT

## 2020-10-22 RX ORDER — SODIUM CHLORIDE 0.9 % (FLUSH) 0.9 %
5-40 SYRINGE (ML) INJECTION AS NEEDED
Status: DISCONTINUED | OUTPATIENT
Start: 2020-10-22 | End: 2020-10-22 | Stop reason: HOSPADM

## 2020-10-22 RX ORDER — SODIUM CHLORIDE 0.9 % (FLUSH) 0.9 %
5-40 SYRINGE (ML) INJECTION EVERY 8 HOURS
Status: DISCONTINUED | OUTPATIENT
Start: 2020-10-22 | End: 2020-10-22 | Stop reason: HOSPADM

## 2020-10-22 RX ORDER — LIDOCAINE HYDROCHLORIDE 20 MG/ML
INJECTION, SOLUTION EPIDURAL; INFILTRATION; INTRACAUDAL; PERINEURAL AS NEEDED
Status: DISCONTINUED | OUTPATIENT
Start: 2020-10-22 | End: 2020-10-22 | Stop reason: HOSPADM

## 2020-10-22 RX ORDER — PROPOFOL 10 MG/ML
INJECTION, EMULSION INTRAVENOUS AS NEEDED
Status: DISCONTINUED | OUTPATIENT
Start: 2020-10-22 | End: 2020-10-22 | Stop reason: HOSPADM

## 2020-10-22 RX ORDER — DEXTROMETHORPHAN/PSEUDOEPHED 2.5-7.5/.8
1.2 DROPS ORAL
Status: DISCONTINUED | OUTPATIENT
Start: 2020-10-22 | End: 2020-10-22 | Stop reason: HOSPADM

## 2020-10-22 RX ORDER — FENTANYL CITRATE 50 UG/ML
50-200 INJECTION, SOLUTION INTRAMUSCULAR; INTRAVENOUS
Status: DISCONTINUED | OUTPATIENT
Start: 2020-10-22 | End: 2020-10-22 | Stop reason: HOSPADM

## 2020-10-22 RX ORDER — NALOXONE HYDROCHLORIDE 0.4 MG/ML
0.4 INJECTION, SOLUTION INTRAMUSCULAR; INTRAVENOUS; SUBCUTANEOUS
Status: DISCONTINUED | OUTPATIENT
Start: 2020-10-22 | End: 2020-10-22 | Stop reason: HOSPADM

## 2020-10-22 RX ORDER — SODIUM CHLORIDE 9 MG/ML
INJECTION, SOLUTION INTRAVENOUS
Status: DISCONTINUED | OUTPATIENT
Start: 2020-10-22 | End: 2020-10-22 | Stop reason: HOSPADM

## 2020-10-22 RX ORDER — FLUMAZENIL 0.1 MG/ML
0.2 INJECTION INTRAVENOUS
Status: DISCONTINUED | OUTPATIENT
Start: 2020-10-22 | End: 2020-10-22 | Stop reason: HOSPADM

## 2020-10-22 RX ORDER — ATROPINE SULFATE 0.1 MG/ML
0.5 INJECTION INTRAVENOUS
Status: DISCONTINUED | OUTPATIENT
Start: 2020-10-22 | End: 2020-10-22 | Stop reason: HOSPADM

## 2020-10-22 RX ORDER — MIDAZOLAM HYDROCHLORIDE 1 MG/ML
5-10 INJECTION, SOLUTION INTRAMUSCULAR; INTRAVENOUS
Status: DISCONTINUED | OUTPATIENT
Start: 2020-10-22 | End: 2020-10-22 | Stop reason: HOSPADM

## 2020-10-22 RX ORDER — EPINEPHRINE 0.1 MG/ML
1 INJECTION INTRACARDIAC; INTRAVENOUS
Status: DISCONTINUED | OUTPATIENT
Start: 2020-10-22 | End: 2020-10-22 | Stop reason: HOSPADM

## 2020-10-22 RX ORDER — SODIUM CHLORIDE 9 MG/ML
50 INJECTION, SOLUTION INTRAVENOUS CONTINUOUS
Status: DISCONTINUED | OUTPATIENT
Start: 2020-10-22 | End: 2020-10-22 | Stop reason: HOSPADM

## 2020-10-22 RX ADMIN — PROPOFOL 50 MG: 10 INJECTION, EMULSION INTRAVENOUS at 11:16

## 2020-10-22 RX ADMIN — LIDOCAINE HYDROCHLORIDE 50 MG: 20 INJECTION, SOLUTION EPIDURAL; INFILTRATION; INTRACAUDAL; PERINEURAL at 11:15

## 2020-10-22 RX ADMIN — PROPOFOL 100 MG: 10 INJECTION, EMULSION INTRAVENOUS at 11:15

## 2020-10-22 RX ADMIN — SODIUM CHLORIDE: 900 INJECTION, SOLUTION INTRAVENOUS at 10:58

## 2020-10-22 RX ADMIN — PROPOFOL 50 MG: 10 INJECTION, EMULSION INTRAVENOUS at 11:18

## 2020-10-22 RX ADMIN — PROPOFOL 20 MG: 10 INJECTION, EMULSION INTRAVENOUS at 11:20

## 2020-10-22 NOTE — ANESTHESIA PREPROCEDURE EVALUATION
Relevant Problems   No relevant active problems       Anesthetic History   No history of anesthetic complications            Review of Systems / Medical History  Patient summary reviewed, nursing notes reviewed and pertinent labs reviewed    Pulmonary    COPD    Sleep apnea           Neuro/Psych              Cardiovascular    Hypertension          CAD    Exercise tolerance: >4 METS     GI/Hepatic/Renal     GERD      Liver disease    Comments: Cirrhosis   varicies Endo/Other    Diabetes    Arthritis     Other Findings            Physical Exam    Airway  Mallampati: I  TM Distance: > 6 cm  Neck ROM: normal range of motion   Mouth opening: Normal     Cardiovascular    Rhythm: regular  Rate: normal         Dental  No notable dental hx       Pulmonary  Breath sounds clear to auscultation               Abdominal         Other Findings            Anesthetic Plan    ASA: 3  Anesthesia type: MAC          Induction: Intravenous  Anesthetic plan and risks discussed with: Patient

## 2020-10-22 NOTE — PROGRESS NOTES

## 2020-10-22 NOTE — ANESTHESIA POSTPROCEDURE EVALUATION
Post-Anesthesia Evaluation and Assessment    Patient: Isabel Kiran MRN: [id number]  SSN: [id number]    YOB: 1955  Age: 76 y.o. Sex: male      I have evaluated the patient and they are stable and ready for discharge from the PACU. Cardiovascular Function/Vital Signs  Visit Vitals  /72   Pulse 62   Temp 36.7 °C (98.1 °F)   Resp 18   Ht 5' 10\" (1.778 m)   Wt 103.9 kg (229 lb)   SpO2 97%   BMI 32.86 kg/m²       Patient is status post MAC anesthesia for Procedure(s):  ESOPHAGOGASTRODUODENOSCOPY (EGD). Nausea/Vomiting: None    Postoperative hydration reviewed and adequate. Pain:  Pain Scale 1: Numeric (0 - 10) (10/22/20 1217)  Pain Intensity 1: 0 (10/22/20 1217)   Managed    Neurological Status: At baseline    Mental Status, Level of Consciousness: Alert and  oriented to person, place, and time    Pulmonary Status:   O2 Device: Room air (10/22/20 1217)   Adequate oxygenation and airway patent    Complications related to anesthesia: None    Post-anesthesia assessment completed. No concerns    Signed By: Tammy Arroyo MD     October 22, 2020              Procedure(s):  ESOPHAGOGASTRODUODENOSCOPY (EGD).     MAC    <BSHSIANPOST>    INITIAL Post-op Vital signs:   Vitals Value Taken Time   /72 10/22/2020 12:03 PM   Temp 36.7 °C (98.1 °F) 10/22/2020 11:32 AM   Pulse 62 10/22/2020 12:03 PM   Resp 18 10/22/2020 12:03 PM   SpO2 97 % 10/22/2020 12:03 PM

## 2020-10-22 NOTE — DISCHARGE INSTRUCTIONS
[address], MD, Trang Mathis MD, MPH      Opal Velasquez, NANCY Red, [facility]-BC     Lauren Jaffe [facility]   Rene Montelongo JESSICA Peñaloza [facility]       Demetria Deputado Terrance De Rodriguez 136    at [address], [address] [place] [address], [place]  22.    [phone]    FAX: [address] [place] [address], [address]    [phone]    FAX: [phone]         ENDOSCOPY DISCHARGE INSTRUCTIONS      Rose Rushing  1955  Date: 10/22/2020    DISCOMFORT:  Use lozenges or warm salt water gargle for sore thoat  Apply warm compress to IV site if red. If redness or soreness persists call the office. You may experience gas and bloating. Walking and belching will help relieve this. You may experience chest discomfort or pressure especially if banding of esophageal varices was performed. DIET:  You may resume your normal diet. ACTIVITY:  Spend the remainder of the day resting. Avoid any strenuous activity. You may not operate a vehicle for 12 hours. You may not engage in an occupation involving machinery or appliances for rest of today. Avoid making any critical or financial decisions for at least 24 hour. Call the [employer] [address] if you have any of the following:  Increasing chest or abdominal pain, nausea, vomiting, vomiting blood, abdominal distension or swelling, fever or chills, bloody discharge from nose or mouth or shortness of breath. Follow-up Instructions:  Call Dr. Marce Talavera for any questions or problems at the phone number listed above. If a biopsy was performed, you will be contacted by the office staff or Dr Marce Talavera within 1 week.    If you have not heard from us by then you may call the office at the phone number listed above to inquire about the results. ENDOSCOPY FINDINGS:  Your endoscopy was normal.  Will repeat EGD to look for development of varices in 3 years. DISCHARGE SUMMARY from the Nurse: The following personal items collected during your admission are returned to you:   Dental Appliance: Dental Appliances: At home, Partials  Vision: Visual Aid: Glasses  Hearing Aid:    Jewelry:    Clothing:    Other Valuables:    Valuables sent to safe:                          Learning About Coronavirus (COVID-19)  Coronavirus (COVID-19): Overview  What is coronavirus (UOPGA-29)? The coronavirus disease (COVID-19) is caused by a virus. It is an illness that was first found in [place], [place], in December 2019. It has since spread worldwide. The virus can cause fever, cough, and trouble breathing. In severe cases, it can cause pneumonia and make it hard to breathe without help. It can cause death. Coronaviruses are a large group of viruses. They cause the common cold. They also cause more serious illnesses like Middle East respiratory syndrome (MERS) and severe acute respiratory syndrome (SARS). COVID-19 is caused by a novel coronavirus. That means it's a new type that has not been seen in people before. This virus spreads person-to-person through droplets from coughing and sneezing. It can also spread when you are close to someone who is infected. And it can spread when you touch something that has the virus on it, such as a doorknob or a tabletop. What can you do to protect yourself from coronavirus (COVID-19)? The best way to protect yourself from getting sick is to:  · Avoid areas where there is an outbreak. · Avoid contact with people who may be infected. · Wash your hands often with soap or alcohol-based hand sanitizers. · Avoid crowds and try to stay at least 6 feet away from other people. · Wash your hands often, especially after you cough or sneeze.  Use soap and water, and scrub for at least 20 seconds. If soap and water aren't available, use an alcohol-based hand . · Avoid touching your mouth, nose, and eyes. What can you do to avoid spreading the virus to others? To help avoid spreading the virus to others:  · Cover your mouth with a tissue when you cough or sneeze. Then throw the tissue in the trash. · Use a disinfectant to clean things that you touch often. · Stay home if you are sick or have been exposed to the virus. Don't go to school, work, or public areas. And don't use public transportation. · If you are sick:  ? Leave your home only if you need to get medical care. But call the doctor's office first so they know you're coming. And wear a face mask, if you have one.  ? If you have a face mask, wear it whenever you're around other people. It can help stop the spread of the virus when you cough or sneeze. ? Clean and disinfect your home every day. Use household  and disinfectant wipes or sprays. Take special care to clean things that you grab with your hands. These include doorknobs, remote controls, phones, and handles on your refrigerator and microwave. And don't forget countertops, tabletops, bathrooms, and computer keyboards. When to call for help  Call 911 anytime you think you may need emergency care. For example, call if:  · You have severe trouble breathing. (You can't talk at all.)  · You have constant chest pain or pressure. · You are severely dizzy or lightheaded. · You are confused or can't think clearly. · Your face and lips have a blue color. · You pass out (lose consciousness) or are very hard to wake up. Call your doctor now if you develop symptoms such as:  · Shortness of breath. · Fever. · Cough. If you need to get care, call ahead to the doctor's office for instructions before you go. Make sure you wear a face mask, if you have one, to prevent exposing other people to the virus.   Where can you get the latest information? The following health organizations are tracking and studying this virus. Their websites contain the most up-to-date information. Stanton Marrero also learn what to do if you think you may have been exposed to the virus. · U.S. Centers for Disease Control and Prevention (CDC): The CDC provides updated news about the disease and travel advice. The website also tells you how to prevent the spread of infection. www.cdc.gov  · World Health Organization [facility]): WHO offers information about the virus outbreaks. WHO also has travel advice. www.who.int  Current as of: April 1, 2020               Content Version: 12.4  © 2006-2020 Healthwise, Incorporated. Care instructions adapted under license by your healthcare professional. If you have questions about a medical condition or this instruction, always ask your healthcare professional. [address] any warranty or liability for your use of this information.

## 2020-10-22 NOTE — ROUTINE PROCESS
Amilcar Reduce Data  1955  [id number]    Situation:  Verbal report received from: Maribel Rutherford  Procedure: Procedure(s):  ESOPHAGOGASTRODUODENOSCOPY (EGD)    Background:    Preoperative diagnosis: CIRRHOSIS OF LIVER WITHOUT ASCITES, UNSPECIFIED HEPATIC CIRRHOSIS TYPE  Postoperative diagnosis: Normal EGD    :  Dr. Karin Loyd  Assistant(s): Endoscopy Technician-1: Madyson Polanco  Endoscopy RN-1: Edyta Guo RN    Specimens: * No specimens in log *  H. Pylori  no    Assessment:  Intra-procedure medications   Anesthesia gave intra-procedure sedation and medications, see anesthesia flow sheet yes    Intravenous fluids: NS@ KVO     Vital signs stable     Abdominal assessment: round and soft   Recommendation:  Discharge patient per MD order.   Family or Friend Spouse  Permission to share finding with family or friend yes

## 2020-10-22 NOTE — PROCEDURES
[address] [facility] Guadlupe Catal[facility], MD, MPH      NANCY Mao, [facility]     April S Alannah, [facility]   BERT Hoskins-EDWIN Reyes [facility]       Demetria [facility] 136    at [address]    [address] [place] [address], [address] [facility] 22.    [phone]    FAX: [address] [place] [address] Avenue    [address], [address]    [phone]    FAX: [phone]         UPPER ENDOSCOPY PROCEDURE NOTE    Jerome Elle  1955    INDICATION: Cirrhosis. Screening for esophageal varices with variceal ligation if  indicated. : Amanda Goode MD    SURGICAL ASSISTANT:  None    PROSTHETIC DEVISES, TISSUE GRAFTS, ORGAN TRANSPLANTS:  Not applicable     ANESTHESIA/SEDATION: Sedation per anesthesiology      PROCEDURE DESCRIPTION:  [employer] consent was obtained from the patient for the procedure. All risks and benefits of the procedure explained. The procedure was performed in the endoscopy suite. The patient was laying on a stretcher and moved to the left lateral decubitus position prior to administration of sedation. Sedation was administered by anesthesiology. See their note for details. The endoscope was inserted into the mouth and advanced under direct vision to the second portion of the duodenum. Careful inspection of upper gastrointestinal tract was made as the endoscope was inserted and withdrawn. Retroflexion of the endoscope to view of the cardia of the stomach was performed. The findings and interventions are described below. FINDINGS:  Esophagus:    Normal.      Stomach:   Normal  No portal hypertensive gastropathy   No gastric varices identified.     Duodenum:   Normal bulb and second portion    INTERVENTION:   None    COMPLICATIONS: None. The patient tolerated the procedure well. EBL: Negligible. RECOMMENDATIONS:  Observe until discharge parameters are achieved. May resume previous diet. Repeat endoscopy to reassess varices and need for banding in 3 years. Follow-up Liver [facility] office as scheduled.       MD Randolph WillB[facility] 13  [address] [place] [address] A, [address] [place] [address] 22.  [phone]  [address] [place] [address]

## 2020-10-22 NOTE — H&P
Dann Prader, MD, Kenn Bowers MD, MPH      NANCY Leos, [facility]-BC     Lauren Jaffe, [facility]   Cary Butler, P-EDWIN Harris, [facility]       Demetria Lowe [facility] 136    at [place] [address], [address] [address].    [phone]    FAX: [address] [place] [address], [address]    [phone]    FAX: [phone]         PRE-PROCEDURE NOTE - EGD    H and P from last office visit reviewed. Allergies reviewed. Out-patient medicaton list reviewed. Patient Active Problem List   Diagnosis Code    Diastolic CHF, acute on chronic (HCC) I50.33    CAD (coronary artery disease) I25.10    Chronic airway obstruction, not elsewhere classified J44.9    HTN (hypertension), benign I10    Anemia, unspecified D64.9    Obstructive sleep apnea (adult) (pediatric) G47.33    Chronic blood loss anemia D50.0    NAFLD (nonalcoholic fatty liver disease) K76.0    Type II diabetes mellitus (HCC) E11.9    Cirrhosis of liver without ascites (HCC) K74.60       Allergies   Allergen Reactions    Ciprofloxacin Hives     Pt states he is not allergic to this.  Ezetimibe Other (comments)     Muscle pain.  Penicillins Rash    Pravastatin Other (comments)     Muscle pain.  Simvastatin Other (comments)     Muscle pain. No current facility-administered medications on file prior to encounter.       Current Outpatient Medications on File Prior to Encounter   Medication Sig Dispense Refill    gabapentin (NEURONTIN) 100 mg capsule TAKE ONE CAPSULE BY MOUTH TWICE DAILY      meloxicam (MOBIC) 15 mg tablet TAKE ONE TABLET BY MOUTH EVERY DAY      atorvastatin (LIPITOR) 20 mg tablet Take 20 mg by mouth daily.  aspirin 81 mg chewable tablet Take 81 mg by mouth daily.  insulin glargine,hum.rec.anlog (TOUJEO SOLOSTAR U-300 INSULIN SC) by SubCUTAneous route. 26-36 units subcutaneous at night depending on BS.  insulin lispro (HUMALOG KWIKPEN INSULIN SC) 3-14 Units by SubCUTAneous route three (3) times daily.  ergocalciferol (VITAMIN D2) 50,000 unit capsule Take 50,000 Units by mouth every seven (7) days.  lansoprazole (PREVACID PO) Take  by mouth. OTC. Takes 2 po in am.      fluticasone furoate (ARNUITY ELLIPTA) 100 mcg/actuation dsdv inhaler Take 1 Puff by inhalation daily.  diltiazem (TIAZAC) 180 mg SR capsule Take 180 mg by mouth daily.  guaiFENesin (ORGANIDIN) 400 mg tablet Take 400 mg by mouth daily.  losartan (COZAAR) 50 mg tablet Take 50 mg by mouth daily.  metoprolol-XL (TOPROL-XL) 50 mg XL tablet Take 50 mg by mouth daily.  montelukast (SINGULAIR) 10 mg tablet Take 10 mg by mouth daily.  umeclidinium-vilanterol (ANORO ELLIPTA) 62.5-25 mcg/actuation inhaler Take 1 Puff by inhalation daily.  exenatide microspheres (BYDUREON SC) 2 Units by SubCUTAneous route every seven (7) days.  TRUE METRIX GLUCOSE TEST STRIP strip test blood sugar 3 TO FOUR TIMES daily  99    PEN NEEDLE 31 gauge x 3/16\" ndle USE TO GIVE INJECTIONS FOUR TIMES DAILY  3    albuterol (PROAIR HFA) 90 mcg/actuation inhaler Take 2 Puffs by inhalation every four (4) hours as needed.  nitroglycerin (NITROSTAT) 0.4 mg SL tablet by SubLINGual route every five (5) minutes as needed.  albuterol (VENTOLIN HFA) 90 mcg/actuation inhaler Take 2 Puffs by inhalation every four (4) hours as needed. For EGD to assess for esophageal and gastric varices. Plan to perform banding if indicated based upon variceal size and appearance. The risks of the procedure were discussed with the patient.   These included reaction to anesthesia, pain, perforation and bleeding. All questions were answered. The patient wishes to proceed with the procedure. The patient was counseled at length about the risks of audrey Covid-19 in the shiloh-operative and post-operative states including the recovery window of their procedure. The patient was made aware that audrey Covid-19 after a surgical procedure may worsen their prognosis for recovering from the virus and lend to a higher morbidity and or mortality risk. The patient was given the options of postponing their procedure. All of the risks, benefits, and alternatives were discussed. The patient does  wish to proceed with the procedure. PHYSICAL EXAMINATION:  VS: per nursing note  General: No acute distress. Eyes: Sclera anicteric. ENT: No oral lesions. Thyroid normal.  Nodes: No adenopathy. Skin: No spider angiomata. No jaundice. No palmar erythema. Respiratory: Lungs clear to auscultation. Cardiovascular: Regular heart rate. No murmurs. No JVD. Abdomen: Soft non-tender, liver size normal to percussion/palpation. Spleen not palpable. No obvious ascites. Extremities: No edema. No muscle wasting. No gross arthritic changes. Neurologic: Alert and oriented. Cranial nerves grossly intact. No asterixis. MOST RECENT LABORATORY STUDIES:  Lab Results   Component Value Date/Time    WBC 9.8 09/04/2019 12:00 AM    Hemoglobin (POC) 13.3 12/05/2012 12:23 PM    HGB 14.3 09/04/2019 12:00 AM    Hematocrit (POC) 39 12/05/2012 12:23 PM    HCT 42.8 09/04/2019 12:00 AM    PLATELET 533 (L) 25/41/3819 12:00 AM    MCV 92 09/04/2019 12:00 AM     Lab Results   Component Value Date/Time    INR 1.1 09/04/2019 12:00 AM    INR 1.0 04/12/2019 08:39 AM    INR 1.0 06/15/2018 08:59 AM    Prothrombin time 11.2 09/04/2019 12:00 AM    Prothrombin time 10.7 04/12/2019 08:39 AM    Prothrombin time 10.9 06/15/2018 08:59 AM       ASSESSMENT AND PLAN:  EGD to assess for esophageal and/or gastric varices.   Sedation per anesthesiology    Yen Tabares MD  [facility] [address], [address] [facility], [place]  22.  [phone]  [facility] [address]

## 2020-11-20 DIAGNOSIS — K74.60 CIRRHOSIS OF LIVER WITHOUT ASCITES, UNSPECIFIED HEPATIC CIRRHOSIS TYPE (HCC): Primary | ICD-10-CM

## 2020-11-20 DIAGNOSIS — K75.81 NASH (NONALCOHOLIC STEATOHEPATITIS): ICD-10-CM

## 2021-03-09 ENCOUNTER — DOCUMENTATION ONLY (OUTPATIENT)
Dept: HEMATOLOGY | Age: 66
End: 2021-03-09

## 2021-03-09 NOTE — PROGRESS NOTES
rec'd call from patient with update on his condition. He reports that he woke one day in late 1/2021 unable to speak coherent sentences and upon imaging at [address] [facility] was found to have brain tumor. 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 He has had radiation and has started first round of chemo/immunotherapy. He is tolerating treatment well and initial PET scan has shown stabilization of disease. No apparent liver mets/mass but notation of known fatty liver disease. Will plan on seeing patient back in follow-up within 2-3 months for routine care.      Oracio Diaz PA-C  Liver [facility] 59, [address] [facility] 22.  [phone]  [address] [facility]

## 2021-03-10 ENCOUNTER — TELEPHONE (OUTPATIENT)
Dept: HEMATOLOGY | Age: 66
End: 2021-03-10

## 2021-03-10 NOTE — TELEPHONE ENCOUNTER
----- Message from Jesus Eastman sent at 3/9/2021 10:08 AM EST -----  Regarding: schedule follow-up  Please contact patient to schedule follow-up in late may or June 2021 with me. Thanks.

## 2021-03-10 NOTE — TELEPHONE ENCOUNTER
Returned phone call to patient regarding appointment request.  Left message to call back once received.

## 2022-03-18 PROBLEM — K74.60 CIRRHOSIS OF LIVER WITHOUT ASCITES (HCC): Status: ACTIVE | Noted: 2018-06-15

## 2022-03-19 PROBLEM — K76.0 NAFLD (NONALCOHOLIC FATTY LIVER DISEASE): Status: ACTIVE | Noted: 2018-02-16

## 2022-03-19 PROBLEM — E11.9 TYPE II DIABETES MELLITUS (HCC): Status: ACTIVE | Noted: 2018-05-17

## 2022-09-30 ENCOUNTER — HOSPITAL ENCOUNTER (OUTPATIENT)
Age: 67
Setting detail: OUTPATIENT SURGERY
Discharge: HOME | End: 2022-09-30
Attending: INTERNAL MEDICINE | Admitting: INTERNAL MEDICINE
Payer: MEDICARE

## 2022-09-30 ENCOUNTER — ANESTHESIA (OUTPATIENT)
Dept: ENDOSCOPY | Age: 67
End: 2022-09-30
Payer: MEDICARE

## 2022-09-30 ENCOUNTER — ANESTHESIA EVENT (OUTPATIENT)
Dept: ENDOSCOPY | Age: 67
End: 2022-09-30
Payer: MEDICARE

## 2022-09-30 VITALS
WEIGHT: 225 LBS | TEMPERATURE: 97.9 F | BODY MASS INDEX: 32.21 KG/M2 | OXYGEN SATURATION: 97 % | HEIGHT: 70 IN | RESPIRATION RATE: 19 BRPM | SYSTOLIC BLOOD PRESSURE: 111 MMHG | HEART RATE: 59 BPM | DIASTOLIC BLOOD PRESSURE: 55 MMHG

## 2022-09-30 PROCEDURE — 76040000007: Performed by: INTERNAL MEDICINE

## 2022-09-30 PROCEDURE — 74011250637 HC RX REV CODE- 250/637: Performed by: INTERNAL MEDICINE

## 2022-09-30 PROCEDURE — 74011250636 HC RX REV CODE- 250/636: Performed by: NURSE ANESTHETIST, CERTIFIED REGISTERED

## 2022-09-30 PROCEDURE — 74011000250 HC RX REV CODE- 250: Performed by: NURSE ANESTHETIST, CERTIFIED REGISTERED

## 2022-09-30 PROCEDURE — 2709999900 HC NON-CHARGEABLE SUPPLY: Performed by: INTERNAL MEDICINE

## 2022-09-30 PROCEDURE — 77030021593 HC FCPS BIOP ENDOSC BSC -A: Performed by: INTERNAL MEDICINE

## 2022-09-30 PROCEDURE — 88305 TISSUE EXAM BY PATHOLOGIST: CPT

## 2022-09-30 PROCEDURE — 76060000032 HC ANESTHESIA 0.5 TO 1 HR: Performed by: INTERNAL MEDICINE

## 2022-09-30 RX ORDER — SODIUM CHLORIDE 0.9 % (FLUSH) 0.9 %
5-40 SYRINGE (ML) INJECTION AS NEEDED
Status: DISCONTINUED | OUTPATIENT
Start: 2022-09-30 | End: 2022-09-30 | Stop reason: HOSPADM

## 2022-09-30 RX ORDER — SODIUM CHLORIDE 9 MG/ML
50 INJECTION, SOLUTION INTRAVENOUS CONTINUOUS
Status: DISCONTINUED | OUTPATIENT
Start: 2022-09-30 | End: 2022-09-30 | Stop reason: HOSPADM

## 2022-09-30 RX ORDER — FENTANYL CITRATE 50 UG/ML
100 INJECTION, SOLUTION INTRAMUSCULAR; INTRAVENOUS
Status: DISCONTINUED | OUTPATIENT
Start: 2022-09-30 | End: 2022-09-30 | Stop reason: HOSPADM

## 2022-09-30 RX ORDER — PHENYLEPHRINE HCL IN 0.9% NACL 0.4MG/10ML
SYRINGE (ML) INTRAVENOUS AS NEEDED
Status: DISCONTINUED | OUTPATIENT
Start: 2022-09-30 | End: 2022-09-30 | Stop reason: HOSPADM

## 2022-09-30 RX ORDER — ATROPINE SULFATE 0.1 MG/ML
0.5 INJECTION INTRAVENOUS
Status: DISCONTINUED | OUTPATIENT
Start: 2022-09-30 | End: 2022-09-30 | Stop reason: HOSPADM

## 2022-09-30 RX ORDER — SODIUM CHLORIDE 9 MG/ML
INJECTION, SOLUTION INTRAVENOUS
Status: DISCONTINUED | OUTPATIENT
Start: 2022-09-30 | End: 2022-09-30 | Stop reason: HOSPADM

## 2022-09-30 RX ORDER — DEXTROMETHORPHAN/PSEUDOEPHED 2.5-7.5/.8
1.2 DROPS ORAL
Status: DISCONTINUED | OUTPATIENT
Start: 2022-09-30 | End: 2022-09-30 | Stop reason: HOSPADM

## 2022-09-30 RX ORDER — NALOXONE HYDROCHLORIDE 0.4 MG/ML
0.4 INJECTION, SOLUTION INTRAMUSCULAR; INTRAVENOUS; SUBCUTANEOUS
Status: DISCONTINUED | OUTPATIENT
Start: 2022-09-30 | End: 2022-09-30 | Stop reason: HOSPADM

## 2022-09-30 RX ORDER — SODIUM CHLORIDE 0.9 % (FLUSH) 0.9 %
5-40 SYRINGE (ML) INJECTION EVERY 8 HOURS
Status: DISCONTINUED | OUTPATIENT
Start: 2022-09-30 | End: 2022-09-30 | Stop reason: HOSPADM

## 2022-09-30 RX ORDER — EPINEPHRINE 0.1 MG/ML
1 INJECTION INTRACARDIAC; INTRAVENOUS
Status: DISCONTINUED | OUTPATIENT
Start: 2022-09-30 | End: 2022-09-30 | Stop reason: HOSPADM

## 2022-09-30 RX ORDER — FLUMAZENIL 0.1 MG/ML
0.2 INJECTION INTRAVENOUS
Status: DISCONTINUED | OUTPATIENT
Start: 2022-09-30 | End: 2022-09-30 | Stop reason: HOSPADM

## 2022-09-30 RX ORDER — LIDOCAINE HYDROCHLORIDE 20 MG/ML
INJECTION, SOLUTION EPIDURAL; INFILTRATION; INTRACAUDAL; PERINEURAL AS NEEDED
Status: DISCONTINUED | OUTPATIENT
Start: 2022-09-30 | End: 2022-09-30 | Stop reason: HOSPADM

## 2022-09-30 RX ORDER — MIDAZOLAM HYDROCHLORIDE 1 MG/ML
.25-1 INJECTION, SOLUTION INTRAMUSCULAR; INTRAVENOUS
Status: DISCONTINUED | OUTPATIENT
Start: 2022-09-30 | End: 2022-09-30 | Stop reason: HOSPADM

## 2022-09-30 RX ORDER — PROPOFOL 10 MG/ML
INJECTION, EMULSION INTRAVENOUS AS NEEDED
Status: DISCONTINUED | OUTPATIENT
Start: 2022-09-30 | End: 2022-09-30 | Stop reason: HOSPADM

## 2022-09-30 RX ADMIN — PROPOFOL 50 MG: 10 INJECTION, EMULSION INTRAVENOUS at 13:42

## 2022-09-30 RX ADMIN — PROPOFOL 50 MG: 10 INJECTION, EMULSION INTRAVENOUS at 14:01

## 2022-09-30 RX ADMIN — PROPOFOL 50 MG: 10 INJECTION, EMULSION INTRAVENOUS at 13:58

## 2022-09-30 RX ADMIN — Medication 200 MCG: at 14:01

## 2022-09-30 RX ADMIN — PROPOFOL 100 MG: 10 INJECTION, EMULSION INTRAVENOUS at 13:37

## 2022-09-30 RX ADMIN — PROPOFOL 50 MG: 10 INJECTION, EMULSION INTRAVENOUS at 13:51

## 2022-09-30 RX ADMIN — PROPOFOL 50 MG: 10 INJECTION, EMULSION INTRAVENOUS at 13:49

## 2022-09-30 RX ADMIN — Medication 200 MCG: at 13:54

## 2022-09-30 RX ADMIN — PROPOFOL 50 MG: 10 INJECTION, EMULSION INTRAVENOUS at 13:40

## 2022-09-30 RX ADMIN — PROPOFOL 50 MG: 10 INJECTION, EMULSION INTRAVENOUS at 13:44

## 2022-09-30 RX ADMIN — PROPOFOL 50 MG: 10 INJECTION, EMULSION INTRAVENOUS at 13:39

## 2022-09-30 RX ADMIN — PROPOFOL 50 MG: 10 INJECTION, EMULSION INTRAVENOUS at 13:46

## 2022-09-30 RX ADMIN — Medication 200 MCG: at 13:52

## 2022-09-30 RX ADMIN — SODIUM CHLORIDE: 900 INJECTION, SOLUTION INTRAVENOUS at 13:20

## 2022-09-30 RX ADMIN — PROPOFOL 50 MG: 10 INJECTION, EMULSION INTRAVENOUS at 13:54

## 2022-09-30 RX ADMIN — PROPOFOL 50 MG: 10 INJECTION, EMULSION INTRAVENOUS at 13:41

## 2022-09-30 RX ADMIN — LIDOCAINE HYDROCHLORIDE 100 MG: 20 INJECTION, SOLUTION EPIDURAL; INFILTRATION; INTRACAUDAL; PERINEURAL at 13:37

## 2022-09-30 RX ADMIN — PROPOFOL 50 MG: 10 INJECTION, EMULSION INTRAVENOUS at 13:38

## 2022-09-30 NOTE — PERIOP NOTES

## 2022-09-30 NOTE — DISCHARGE INSTRUCTIONS
[address]  [address] [facility]  [phone]                                  Carito Baumann  [id number]  1955    It was my pleasure seeing you for your procedure. You will also receive a summary report with the findings from this procedure and any further recommendations. If you had polyps removed or biopsies taken during your procedure, you will receive a separate letter from me within the next 2 weeks. If you don't receive this letter or if you have any questions, please call my office [phone]. Please take note of the post procedure instructions listed below. Mekhi Wishes,    Dr. Rajan Michel    These instructions give you information on caring for yourself after your procedure. Call your doctor if you have any problems or questions after your procedure. HOME CARE  Walk if you have belly cramping or gas. Walking will help get rid of the air and reduce the bloated feeling in your belly (abdomen). Your IV site (where you received drugs) may be tender to touch. Place warm towels on the site; keep your arm up on two pillows if you have any swelling or soreness in the area. You may shower. ACTIVITY:  Take frequent rest periods and move at a slower pace for the next 24 hours. .  You may resume your regular activity tomorrow if you are feeling back to normal.  Do not drive or ride a bicycle for at least 24 hours (because of the medicine (anesthesia) used during the test). Do not sign any important legal documents or use or operate any machinery for 24 hours  Do not take sleeping medicines/nerve drugs for 24 hours unless the doctor tells you. You can return to work/school tomorrow unless otherwise instructed. NUTRITION:  Drink plenty of fluids to keep your pee (urine) clear or pale yellow  Begin with a light meal and progress to your normal diet.  Heavy or fried foods are harder to digest and may make you feel sick to your stomach (nauseated). Once you are feeling back to normal, you may resume your normal diet as instructed by your doctor. Avoid alcoholic beverages for 24 hours or as instructed. IF YOU HAD BIOPSIES TAKEN OR POLYPS REMOVED DURING THE PROCEDURE:  For the next 7 days, avoid all non-steroidal antiinflammatory medications such as Ibuprofen, Motrin, Advil, Alleve, Akiko-seltzer, Goody's powder, BC powder. If you do not have an heart condition that requires you to take a daily aspirin, you should avoid taking aspirin for 7 days. Eat a soft diet for 24 hours. Monitor your stools for any blood or dark black, tar-like, stools as this may be a sign of bleeding and if you see any blood, notify your doctor immediately. GET HELP RIGHT AWAY AND SEEK IMMEDIATE MEDICAL CARE IF:  You have more than a spotting of blood in your stool. You pass clumps of tissue (blood clots) or fill the toilet with blood. Your belly is painfully swollen or puffy (abdominal distention). You throw up (vomit). You have a fever. You have redness, pain or swelling at the IV site that last greater than two days. You have abdominal pain or discomfort that is severe or gets worse throughout the day.     Post-procedure diagnosis:  Gastritis  Small esophageal varices  Diverticulosis  Portal gastropathy    Post-procedure recommendations:    EGD Findings:  Esophagus: one column small esophageal varices in distal esophagus, completely flattened with insufflation   Stomach:diffuse mild portal gastropathy, mild stripy gastritis in antrum, small (2-3 mm) scattered areas of erythema with increased vascularity throughout body and antrum, friable mucosa  Duodenum/jejunum:mild duodenitis, biopsied    Colon Findings:   Rectum: small internal hemorrhoids, edematous mucosa with increased melanin deposition   Sigmoid: mild diverticulosis, edematous mucosa with increased melanin deposition   Descending Colon: edematous mucosa with increased melanin deposition, biopsied   Transverse Colon: edematous mucosa with increased melanin deposition, biopsied   Ascending Colon: edematous mucosa with increased melanin deposition, small scattered scattered areas of erythema with increased vascularity  Cecum: edematous mucosa with increased melanin deposition  Terminal Ileum: normal    Recommendations:   - Await pathology. You should receive a letter within 2 weeks. - Resume normal medications. - There is no evidence of active inflammation in colon. Subtle mucosal changes may be related to portal hypertension or medication related. Diverticulosis: Care Instructions  Your Care Instructions  In diverticulosis, pouches called diverticula form in the wall of the large intestine (colon). The pouches do not cause any pain or other symptoms. Most people who have diverticulosis do not know they have it. But the pouches sometimes bleed, and if they become infected, they can cause pain and other symptoms. When this happens, it is called diverticulitis. Diverticula form when pressure pushes the wall of the colon outward at certain weak points. A diet that is too low in fiber can cause diverticula. Follow-up care is a key part of your treatment and safety. Be sure to make and go to all appointments, and call your doctor if you are having problems. It's also a good idea to know your test results and keep a list of the medicines you take. How can you care for yourself at home? Include fruits, leafy green vegetables, beans, and whole grains in your diet each day. These foods are high in fiber. Take a fiber supplement, such as Citrucel or Metamucil, every day if needed. Read and follow all instructions on the label. Drink plenty of fluids. If you have kidney, heart, or liver disease and have to limit fluids, talk with your doctor before you increase the amount of fluids you drink. Get at least 30 minutes of exercise on most days of the week. Walking is a good choice. You also may want to do other activities, such as running, swimming, cycling, or playing tennis or team sports. Cut out foods that cause gas, pain, or other symptoms. When should you call for help? Call your doctor now or seek immediate medical care if:    You have belly pain. You pass maroon or very bloody stools. You have a fever. You have nausea and vomiting. You have unusual changes in your bowel movements or abdominal swelling. You have burning pain when you urinate. You have abnormal vaginal discharge. You have shoulder pain. You have cramping pain that does not get better when you have a bowel movement or pass gas. You pass gas or stool from your urethra while urinating. Watch closely for changes in your health, and be sure to contact your doctor if you have any problems. Where can you learn more? Go to http://www.gray.com/  Enter Q8098612 in the search box to learn more about \"Diverticulosis: Care Instructions. \"  Current as of: September 8, 2021               Content Version: 13.2  © 2006-2022 [employer]. Care instructions adapted under license by [employer] (which disclaims liability or warranty for this information). If you have questions about a medical condition or this instruction, always ask your healthcare professional. [address] any warranty or liability for your use of this information. Gastritis: Care Instructions  Your Care Instructions     Gastritis is a sore and upset stomach. It happens when something irritates the stomach lining. Many things can cause it. These include an infection such as the flu or something you ate or drank. Medicines or a sore on the lining of the stomach (ulcer) also can cause it. Your belly may bloat and ache. You may belch, vomit, and feel sick to your stomach. You should be able to relieve the problem by taking medicine.  And it may help to change your diet. If gastritis lasts, your doctor may prescribe medicine. Follow-up care is a key part of your treatment and safety. Be sure to make and go to all appointments, and call your doctor if you are having problems. It's also a good idea to know your test results and keep a list of the medicines you take. How can you care for yourself at home? If your doctor prescribed antibiotics, take them as directed. Do not stop taking them just because you feel better. You need to take the full course of antibiotics. Be safe with medicines. If your doctor prescribed medicine to decrease stomach acid, take it as directed. Call your doctor if you think you are having a problem with your medicine. Do not take any other medicine, including over-the-counter pain relievers, without talking to your doctor first.  If your doctor recommends over-the-counter medicine to reduce stomach acid, such as Pepcid AC (famotidine), Prilosec (omeprazole), or Tagamet HB (cimetidine) follow the directions on the label. Drink plenty of fluids to prevent dehydration. Choose water and other clear liquids. If you have kidney, heart, or liver disease and have to limit fluids, talk with your doctor before you increase the amount of fluids you drink. Avoid foods that make your symptoms worse. These may include chocolate, mint, alcohol, pepper, spicy foods, high-fat foods, or drinks with caffeine in them, such as tea, coffee, yordan, or energy drinks. If your symptoms are worse after you eat a certain food, you may want to stop eating it to see if your symptoms get better. When should you call for help? Call 911 anytime you think you may need emergency care. For example, call if:    You vomit blood or what looks like coffee grounds. You pass maroon or very bloody stools. Call your doctor now or seek immediate medical care if:    You start breathing fast and have not produced urine in the last 8 hours. You cannot keep fluids down. Watch closely for changes in your health, and be sure to contact your doctor if:    You do not get better as expected. Where can you learn more? Go to http://www.[employer].com/  Enter Z536 in the search box to learn more about \"Gastritis: Care Instructions. \"  Current as of: September 8, 2021               Content Version: 13.2  © 3013-6947 [employer]. Care instructions adapted under license by [employer] (which disclaims liability or warranty for this information). If you have questions about a medical condition or this instruction, always ask your healthcare professional. [place] [address] any warranty or liability for your use of this information.

## 2022-09-30 NOTE — H&P
[address] [facility]  [address], [address]  [phone]                                History and Physical     NAME: Mendel Plater   :  1955   MRN:  [id number]     HPI:  The patient was seen and examined. Past Surgical History:   Procedure Laterality Date    HX APPENDECTOMY      HX CHOLECYSTECTOMY      HX HEART CATHETERIZATION      7 stents    HX LITHOTRIPSY      multiple    HX ORTHOPAEDIC      knee - fx knee cap x 4 surgery     Past Medical History:   Diagnosis Date    Arthritis     CAD (coronary artery disease)     stents x7, MI in 80's. Chronic bronchitis (HCC)     COPD     Diabetes (HCC)     GERD (gastroesophageal reflux disease)     High cholesterol     Hypertension     Ill-defined condition 2018    C diff following colonoscopy, treated with Vancomycin. Kidney stones     Liver disease     ALFONSO - stage 4    KAMRYN (obstructive sleep apnea)     sleeps with CPAP    Sinusitis      Social History     Tobacco Use    Smoking status: Former    Smokeless tobacco: Never    Tobacco comments:     quit    Substance Use Topics    Alcohol use: No    Drug use: No     Allergies   Allergen Reactions    Ciprofloxacin Hives     Pt states he is not allergic to this. Ezetimibe Other (comments)     Muscle pain. Penicillins Rash    Pravastatin Other (comments)     Muscle pain. Simvastatin Other (comments)     Muscle pain. Family History   Problem Relation Age of Onset    Heart Disease Father     Stroke Father     Alzheimer's Disease Father     Alzheimer's Disease Mother      No current facility-administered medications for this encounter. PHYSICAL EXAM:  General: WD, WN. Alert, cooperative, no acute distress    HEENT: NC, Atraumatic. PERRLA, EOMI. Anicteric sclerae. Lungs:  CTA Bilaterally. No Wheezing/Rhonchi/Rales. Heart:  Regular  rhythm,  No murmur, No Rubs, No Gallops  Abdomen: Soft, Non distended, Non tender.   +Bowel sounds, no HSM  Extremities: No c/c/e  Neurologic:  CN 2-12 gi, Alert and oriented X 3. No acute neurological distress   Psych:   Good insight. Not anxious nor agitated. The heart, lungs and mental status were satisfactory for the administration of MAC sedation and for the procedure. Mallampati score: 2     The patient was counseled at length about the risks of audrey Covid-19 in the [place]il[place]-operative and post-operative states including the recovery window of their procedure. The patient was made aware that audrey Covid-19 after a surgical procedure may worsen their prognosis for recovering from the virus and lend to a higher morbidity and or mortality risk. The patient was given the options of postponing their procedure. All of the risks, benefits, and alternatives were discussed. The patient does wish to proceed with the procedure.       Assessment:    - cirrhosis, colon thickening     Plan:   Endoscopic procedure : egd/colonoscopy  MAC sedation

## 2022-09-30 NOTE — ANESTHESIA PREPROCEDURE EVALUATION
Relevant Problems   RESPIRATORY SYSTEM   (+) Chronic airway obstruction, not elsewhere classified   (+) Obstructive sleep apnea (adult) (pediatric)      CARDIOVASCULAR   (+) CAD (coronary artery disease)   (+) HTN (hypertension), benign      GASTROINTESTINAL   (+) Cirrhosis of liver without ascites (HCC)   (+) NAFLD (nonalcoholic fatty liver disease)      ENDOCRINE   (+) Type II diabetes mellitus (HCC)      HEMATOLOGY   (+) Anemia, unspecified   (+) Chronic blood loss anemia       Anesthetic History   No history of anesthetic complications            Review of Systems / Medical History  Patient summary reviewed, nursing notes reviewed and pertinent labs reviewed    Pulmonary    COPD: moderate    Sleep apnea: CPAP           Neuro/Psych   Within defined limits           Cardiovascular    Hypertension          CAD and cardiac stents    Exercise tolerance: >4 METS  Comments: Reasonable mobility w/o sxs   GI/Hepatic/Renal     GERD      Liver disease    Comments: NAFLD Endo/Other    Diabetes: type 2    Arthritis and anemia     Other Findings            Physical Exam    Airway  Mallampati: II  TM Distance: > 6 cm  Neck ROM: normal range of motion   Mouth opening: Normal     Cardiovascular  Regular rate and rhythm,  S1 and S2 normal,  no murmur, click, rub, or gallop             Dental  No notable dental hx       Pulmonary  Breath sounds clear to auscultation               Abdominal  GI exam deferred       Other Findings            Anesthetic Plan    ASA: 3  Anesthesia type: MAC          Induction: Intravenous  Anesthetic plan and risks discussed with: Patient

## 2022-09-30 NOTE — PROCEDURES
[address] [facility] [address]  [address], [address]   [phone]                           Colonoscopy and EGD Procedure Note      Indications:   History of cirrhosis, ascending colon thickening per CT scan       :  Jay Martinez MD    Staff: Endoscopy Technician-1: Deep PINEDA  Endoscopy RN-1: Griffin Ozuna RN    Referring Provider: Tremayne Jacobs MD    Sedation:  MAC anesthesia    Procedure Details:  After informed consent was obtained with all risks and benefits of procedure explained and pre-operative exam completed, pt was placed in the left lateral decubitus position. Following sequential administration of sedation as per above, the gastroscope was inserted into the mouth and advanced under direct vision to second portion of the duodenum. A careful inspection was made as the gastroscope was withdrawn, including a retroflexed view of the proximal stomach; findings and interventions are described below. EGD Findings:  Esophagus: one column small esophageal varices in distal esophagus, completely flattened with insufflation   Stomach:diffuse mild portal gastropathy, mild stripy gastritis in antrum, small (2-3 mm) scattered areas of erythema with increased vascularity throughout body and antrum, friable mucosa  Duodenum/jejunum: mild duodenitis, biopsied    EGD Interventions:  biopsies     The bed was then turned and upon sequential sedation as per above, a digital rectal exam was performed per below. The Olympus videocolonoscope was inserted in the rectum and carefully advanced to the terminal ileum. The quality of preparation was good. [place] Bowel Prep Score  3/3/3. The colonoscope was slowly withdrawn with careful evaluation between folds. Retroflexion in the rectum was performed.      Colon Findings:   Rectum: small internal hemorrhoids, edematous mucosa with increased melanin deposition   Sigmoid: mild diverticulosis, edematous mucosa with increased melanin deposition   Descending Colon: edematous mucosa with increased melanin deposition, biopsied   Transverse Colon: edematous mucosa with increased melanin deposition, biopsied   Ascending Colon: edematous mucosa with increased melanin deposition, small scattered scattered areas of erythema with increased vascularity  Cecum: edematous mucosa with increased melanin deposition  Terminal Ileum: normal    Colonoscopy Interventions:  biopsies            Specimens Removed:    ID Type Source Tests Collected by Time Destination   1 : Duodenum biopsy Preservative Duodenum  Tayler Hernandez MD 9/30/2022 1344 Pathology   2 : Random colon biopsy Preservative Random colon  Tayler Hernandez MD 9/30/2022 1400 Pathology       Complications: None. EBL:  minimal     Impression:    See Postoperative diagnosis above    Recommendations:   - Await pathology. You should receive a letter within 2 weeks. - Resume normal medications. - There is no evidence of active inflammation in colon. Subtle mucosal changes may be related to portal hypertension or medication related. Discharge Disposition:  Home in the company of a  when able to ambulate.     Elver Streeter MD  9/30/2022  2:06 PM

## 2022-09-30 NOTE — ANESTHESIA POSTPROCEDURE EVALUATION
Post-Anesthesia Evaluation and Assessment    Patient: Allison Doan MRN: [id number]  SSN: [id number]    YOB: 1955  Age: 75 y.o. Sex: male      I have evaluated the patient and they are stable and ready for discharge from the PACU. Cardiovascular Function/Vital Signs  Visit Vitals  BP (!) 111/55   Pulse (!) 59   Temp 36.6 °C (97.9 °F)   Resp 19   Ht 5' 10\" (1.778 m)   Wt 102.1 kg (225 lb)   SpO2 97%   BMI 32.28 kg/m²       Patient is status post MAC anesthesia for Procedure(s):  ESOPHAGOGASTRODUODENOSCOPY (EGD)  COLONOSCOPY  ESOPHAGOGASTRODUODENAL (EGD) BIOPSY  COLON BIOPSY. Nausea/Vomiting: None    Postoperative hydration reviewed and adequate. Pain:  Pain Scale 1: Numeric (0 - 10) (09/30/22 1412)  Pain Intensity 1: 0 (09/30/22 1412)   Managed    Neurological Status: At baseline    Mental Status, Level of Consciousness: Alert and  oriented to person, place, and time    Pulmonary Status:   O2 Device: Nasal mask (09/30/22 1407)   Adequate oxygenation and airway patent    Complications related to anesthesia: None    Post-anesthesia assessment completed. No concerns    Signed By: Anshu Carson MD     September 30, 2022              Procedure(s):  ESOPHAGOGASTRODUODENOSCOPY (EGD)  COLONOSCOPY  ESOPHAGOGASTRODUODENAL (EGD) BIOPSY  COLON BIOPSY. MAC    <BSHSIANPOST>    INITIAL Post-op Vital signs:   Vitals Value Taken Time   BP 98/81 09/30/22 1445   Temp     Pulse 62 09/30/22 1447   Resp 14 09/30/22 1447   SpO2 96 % 09/30/22 1447   Vitals shown include unvalidated device data.

## 2023-07-08 NOTE — ANESTHESIA POSTPROCEDURE EVALUATION
Procedure(s):  ESOPHAGOGASTRODUODENOSCOPY (EGD)  ENDOSCOPIC BANDING OR LIGATION. MAC    <BSHSIANPOST>    No vitals data found for the desired time range.
Malnutrition...

## (undated) DEVICE — BAG BELONG PT PERS CLEAR HANDL

## (undated) DEVICE — TUBING HYDR IRR --

## (undated) DEVICE — NEONATAL-ADULT SPO2 SENSOR: Brand: NELLCOR

## (undated) DEVICE — FORCEPS BX L240CM JAW DIA2.8MM L CAP W/ NDL MIC MESH TOOTH

## (undated) DEVICE — KENDALL RADIOLUCENT FOAM MONITORING ELECTRODE -RECTANGULAR SHAPE: Brand: KENDALL

## (undated) DEVICE — TRAY BX SFT TISS W/ RUL ALC PREP PD FEN DRP TWL LUERLOCK

## (undated) DEVICE — SYRINGE MED 20ML STD CLR PLAS LUERLOCK TIP N CTRL DISP

## (undated) DEVICE — 1200 GUARD II KIT W/5MM TUBE W/O VAC TUBE: Brand: GUARDIAN

## (undated) DEVICE — WRISTBAND ID AD W1XL11.5IN RED POLY ALRG PREPRINTED PERM

## (undated) DEVICE — BW-412T DISP COMBO CLEANING BRUSH: Brand: SINGLE USE COMBINATION CLEANING BRUSH

## (undated) DEVICE — CONTAINER SPEC 20 ML LID NEUT BUFF FORMALIN 10 % POLYPR STS

## (undated) DEVICE — ENDO CARRY-ON PROCEDURE KIT INCLUDES ENZYMATIC SPONGE, GAUZE, BIOHAZARD LABEL, TRAY, LUBRICANT, DIRTY SCOPE LABEL, WATER LABEL, TRAY, DRAWSTRING PAD, AND DEFENDO 4-PIECE KIT.: Brand: ENDO CARRY-ON PROCEDURE KIT

## (undated) DEVICE — BAG SPEC BIOHZD LF 2MIL 6X10IN -- CONVERT TO ITEM 357326

## (undated) DEVICE — SET ADMIN 16ML TBNG L100IN 2 Y INJ SITE IV PIGGY BK DISP

## (undated) DEVICE — Device: Brand: MEDICAL ACTION INDUSTRIES

## (undated) DEVICE — CANN NASAL O2 CAPNOGRAPHY AD -- FILTERLINE

## (undated) DEVICE — NEEDLE HYPO 18GA L1.5IN PNK S STL HUB POLYPR SHLD REG BVL

## (undated) DEVICE — CATH IV AUTOGRD BC BLU 22GA 25 -- INSYTE

## (undated) DEVICE — Device

## (undated) DEVICE — KIT IV STRT W CHLORAPREP PD 1ML

## (undated) DEVICE — BITE BLK ENDOSCP AD 54FR GRN POLYETH ENDOSCP W STRP SLD

## (undated) DEVICE — MAX-CORE® DISPOSABLE CORE BIOPSY INSTRUMENT, 16G X 16CM: Brand: MAX-CORE

## (undated) DEVICE — Z DISCONTINUED NO SUB IDED SET EXTN W/ 4 W STPCOCK M SPIN LOK 36IN

## (undated) DEVICE — SOLIDIFIER FLUID 3000 CC ABSORB

## (undated) DEVICE — MULTIPLE BAND LIGATOR: Brand: SPEEDBAND SUPERVIEW SUPER 7